# Patient Record
Sex: FEMALE | Race: BLACK OR AFRICAN AMERICAN | Employment: FULL TIME | URBAN - METROPOLITAN AREA
[De-identification: names, ages, dates, MRNs, and addresses within clinical notes are randomized per-mention and may not be internally consistent; named-entity substitution may affect disease eponyms.]

---

## 2019-01-23 PROBLEM — R93.0 ABNORMAL CT OF THE HEAD: Status: ACTIVE | Noted: 2019-01-23

## 2019-01-23 PROBLEM — R94.31 ABNORMAL EKG: Status: ACTIVE | Noted: 2019-01-23

## 2019-01-23 PROBLEM — R42 LIGHTHEADEDNESS: Status: ACTIVE | Noted: 2019-01-23

## 2019-01-23 PROBLEM — R79.89 ELEVATED TSH: Status: ACTIVE | Noted: 2019-01-23

## 2020-06-09 ENCOUNTER — OFFICE VISIT (OUTPATIENT)
Dept: ORTHOPEDIC SURGERY | Facility: CLINIC | Age: 60
End: 2020-06-09

## 2020-06-09 VITALS
HEIGHT: 64 IN | DIASTOLIC BLOOD PRESSURE: 83 MMHG | HEART RATE: 62 BPM | BODY MASS INDEX: 34.35 KG/M2 | TEMPERATURE: 97.3 F | SYSTOLIC BLOOD PRESSURE: 126 MMHG | WEIGHT: 201.2 LBS

## 2020-06-09 DIAGNOSIS — G89.29 CHRONIC RIGHT SHOULDER PAIN: ICD-10-CM

## 2020-06-09 DIAGNOSIS — M54.2 NECK PAIN: Primary | ICD-10-CM

## 2020-06-09 DIAGNOSIS — M54.12 CERVICAL RADICULOPATHY: ICD-10-CM

## 2020-06-09 DIAGNOSIS — M25.511 CHRONIC RIGHT SHOULDER PAIN: ICD-10-CM

## 2020-06-09 DIAGNOSIS — G56.01 CARPAL TUNNEL SYNDROME ON RIGHT: ICD-10-CM

## 2020-06-09 RX ORDER — ATORVASTATIN CALCIUM 40 MG/1
TABLET, FILM COATED ORAL DAILY
COMMUNITY

## 2020-06-09 RX ORDER — LISINOPRIL 5 MG/1
5 TABLET ORAL DAILY
COMMUNITY
End: 2020-08-05

## 2020-06-09 RX ORDER — ASPIRIN 81 MG/1
81 TABLET ORAL DAILY
COMMUNITY

## 2020-06-09 RX ORDER — METFORMIN HYDROCHLORIDE 500 MG/1
TABLET ORAL 2 TIMES DAILY WITH MEALS
COMMUNITY
End: 2020-08-05

## 2020-06-09 RX ORDER — LEVOTHYROXINE SODIUM 75 UG/1
TABLET ORAL
COMMUNITY

## 2020-06-09 RX ORDER — VENLAFAXINE HYDROCHLORIDE 37.5 MG/1
37.5 TABLET, EXTENDED RELEASE ORAL DAILY
COMMUNITY
End: 2020-08-11

## 2020-06-09 RX ORDER — LIRAGLUTIDE 6 MG/ML
0.6 INJECTION SUBCUTANEOUS
COMMUNITY
End: 2020-08-11

## 2020-06-09 RX ORDER — HYDROCHLOROTHIAZIDE 12.5 MG/1
12.5 TABLET ORAL DAILY
COMMUNITY

## 2020-06-09 RX ORDER — TRAMADOL HYDROCHLORIDE 50 MG/1
50 TABLET ORAL
Qty: 50 TAB | Refills: 0 | Status: SHIPPED | OUTPATIENT
Start: 2020-06-09 | End: 2020-06-16

## 2020-06-09 RX ORDER — NAPROXEN SODIUM 220 MG
220 TABLET ORAL
COMMUNITY
End: 2022-03-07 | Stop reason: ALTCHOICE

## 2020-06-09 RX ORDER — METHOCARBAMOL 750 MG/1
TABLET, FILM COATED ORAL 4 TIMES DAILY
COMMUNITY
End: 2020-08-11
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
## 2020-06-10 ENCOUNTER — TELEPHONE (OUTPATIENT)
Dept: ORTHOPEDIC SURGERY | Facility: CLINIC | Age: 60
End: 2020-06-10

## 2020-06-10 NOTE — TELEPHONE ENCOUNTER
Patient called stating that her tramadol prescription was denied by her insurance and she was not sure why.  She asked if this can be reviewed and corrected please advise patient at [phone]

## 2020-06-10 NOTE — TELEPHONE ENCOUNTER
Spoke with patient she states insurance denied rx and was told she would need to pay out of pocket. Pt states rx is $18 and she will just pay for rx instead. No further questions or concerns.

## 2020-06-24 ENCOUNTER — HOSPITAL ENCOUNTER (OUTPATIENT)
Dept: MRI IMAGING | Age: 60
Discharge: HOME | End: 2020-06-24
Attending: PHYSICIAN ASSISTANT
Payer: MEDICAID

## 2020-06-24 ENCOUNTER — OFFICE VISIT (OUTPATIENT)
Dept: ORTHOPEDIC SURGERY | Age: 60
End: 2020-06-24

## 2020-06-24 VITALS
TEMPERATURE: 98.6 F | BODY MASS INDEX: 34.83 KG/M2 | OXYGEN SATURATION: 99 % | HEIGHT: 64 IN | SYSTOLIC BLOOD PRESSURE: 136 MMHG | HEART RATE: 60 BPM | WEIGHT: 204 LBS | RESPIRATION RATE: 20 BRPM | DIASTOLIC BLOOD PRESSURE: 85 MMHG

## 2020-06-24 DIAGNOSIS — R94.131 ABNORMAL EMG: ICD-10-CM

## 2020-06-24 DIAGNOSIS — M54.12 CERVICAL RADICULOPATHY: ICD-10-CM

## 2020-06-24 DIAGNOSIS — R20.2 NUMBNESS AND TINGLING OF RIGHT UPPER EXTREMITY: Primary | ICD-10-CM

## 2020-06-24 DIAGNOSIS — M54.2 NECK PAIN: ICD-10-CM

## 2020-06-24 DIAGNOSIS — R20.0 NUMBNESS AND TINGLING OF RIGHT UPPER EXTREMITY: Primary | ICD-10-CM

## 2020-06-24 DIAGNOSIS — E66.01 SEVERE OBESITY (HCC): ICD-10-CM

## 2020-06-24 PROCEDURE — 72141 MRI NECK SPINE W/O DYE: CPT

## 2020-06-24 NOTE — PROGRESS NOTES
Birgit Kim presents today for   Chief Complaint   Patient presents with    Arm Pain     right       Is someone accompanying this pt? no    Is the patient using any DME equipment during OV? no    Depression Screening:  3 most recent PHQ Screens 1/23/2019   Little interest or pleasure in doing things Several days   Feeling down, depressed, irritable, or hopeless More than half the days   Total Score PHQ 2 3   Trouble falling or staying asleep, or sleeping too much More than half the days   Feeling tired or having little energy More than half the days   Poor appetite, weight loss, or overeating More than half the days   Feeling bad about yourself - or that you are a failure or have let yourself or your family down Not at all   Trouble concentrating on things such as school, work, reading, or watching TV More than half the days   Moving or speaking so slowly that other people could have noticed; or the opposite being so fidgety that others notice Not at all   Thoughts of being better off dead, or hurting yourself in some way Not at all   PHQ 9 Score 11   How difficult have these problems made it for you to do your work, take care of your home and get along with others Not difficult at all       Coordination of Care:  1. Have you been to the ER, urgent care clinic since your last visit? no  Hospitalized since your last visit? no    2. Have you seen or consulted any other health care providers outside of the [address] [place] [address] since your last visit? no Include any pap smears or colon screening.  no
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
## 2020-07-07 ENCOUNTER — OFFICE VISIT (OUTPATIENT)
Dept: ORTHOPEDIC SURGERY | Facility: CLINIC | Age: 60
End: 2020-07-07

## 2020-07-07 VITALS
HEART RATE: 49 BPM | HEIGHT: 64 IN | WEIGHT: 203.2 LBS | BODY MASS INDEX: 34.69 KG/M2 | DIASTOLIC BLOOD PRESSURE: 80 MMHG | SYSTOLIC BLOOD PRESSURE: 126 MMHG | TEMPERATURE: 97.4 F

## 2020-07-07 DIAGNOSIS — M54.2 NECK PAIN: Primary | ICD-10-CM

## 2020-07-07 DIAGNOSIS — M54.12 CERVICAL RADICULOPATHY: ICD-10-CM

## 2020-07-07 NOTE — PROGRESS NOTES
Ms. Luna Chamberlain follows today for MRI of the cervical spine. She has persisted with right cervical discomfort as well as radicular pain in the right upper extremity. She has weakness in the right arm when compared to the left which is unchanged from previous visit. EMG nerve conduction study revealed distal neuropathy likely diabetes related. Results of MRI below:  Result Information     Status: Final result (Exam End: 6/24/2020 08:54) Provider Status: Reviewed   Study Result     EXAM: MRI OF THE CERVICAL SPINE, WITHOUT IV CONTRAST     CLINICAL INDICATION/HISTORY: 58-year-old patient with right arm, right hand  pain. Cervical radiculopathy with neck pain.    > Additional: None.     COMPARISON: None. > Reference Exam: None.     TECHNIQUE: T1 weighted sagittal and axial, STIR and T2 FSE sagittal, T2 FSE  axial.  _______________     FINDINGS:     OSSEOUS, CERVICAL ALIGNMENT, CRANIOCERVICAL JUNCTION: There is straightening of  usual cervical lordosis present without evidence of listhesis. Vertebral body  heights are maintained. Intervertebral disc height loss and degenerative spur  formation noted throughout several levels of the cervical spine, greatest  conspicuity at the C3-C4, C5-C6, and CT 6-C7 levels. No suspicious osseous  lesion. No bone marrow signal abnormalities identified to suggest fracture. Overall maintenance of normal bone marrow signal with mild endplate reactive  changes involving the C5-C7 segments. Mild degenerative change across  atlantodental articulation noted.       CERVICAL CORD, VISUALIZED POSTERIOR FOSSA: Visualized posterior fossa is  unremarkable. No Chiari I malformation. Cord morphology and signal intensity  are unremarkable throughout.     PARASPINOUS SOFT TISSUES, VISUALIZED SOFT TISSUE NECK: Visualized soft tissues  are unremarkable.     C2-C3: No significant disc pathology.  Mild right-sided facet and uncovertebral  joint proliferative changes are noted without evidence of neuroforaminal  stenosis. No spinal canal stenosis.     C3-C4: Mild disc bulge with central to left paracentral disc protrusion. AP  diameter of the thecal sac at the level of maximal narrowing estimated at  approximately 9 mm. There is asymmetric uncovertebral and facet joint  osteoarthritis with mild neuroforaminal narrowing. Left neural foramen appears  patent.     C4-C5: Minor disc bulge. No spinal canal stenosis. Asymmetric uncovertebral and  facet joint osteoarthritis, right greater than left with moderately severe  right-sided neuroforaminal narrowing. More mild appearing left-sided  neuroforaminal narrowing is present. No spinal canal stenosis.     C5-C6: Disc osteophyte complex with ventral impression of the thecal sac. AP  diameter of the thecal sac estimated at approximately 9 mm. Bilateral, left  greater than right uncovertebral and facet joint osteoarthritis with mild to  moderate left-sided neuroforaminal narrowing. Right neuroforamen patent.     C6-C7: Relatively diffuse disc osteophyte complex with ventral impression of the  thecal sac. Minimal cord deformation without evidence of abnormal internal cord  signal. AP diameter of the thecal sac at this level is estimated at  approximately 8 mm. Mild right-sided uncovertebral joint proliferative change  noted. Facet joints appear within normal limits. Minor right-sided  neuroforaminal narrowing.     C7-T1: No significant disc pathology. Facet joints appear within normal limits. No spinal canal or neuroforaminal stenosis.     For the levels below T1, no significant spinal canal or neuroforaminal narrowing  is present.     _______________     IMPRESSION  IMPRESSION:        1. Overall straightening of usual cervical lordosis without evidence of  listhesis. 2. Multilevel spondylosis, greatest conspicuity across the C5-C7 segments.     > Overall degree of spinal canal stenosis in these regions is mild.  No  abnormal internal cord signal.  3. Uncovertebral and facet joint osteoarthritis with several areas of  neuroforaminal narrowing as described in detail by level above. These findings  appear most pronounced on the right at C4-C5. Plan: Patient being [facility] to [facility] for recommendations regarding above findings consistent with severe neuroforaminal narrowing on the right at C4-5. Patient to follow-up with our office PRN. She has completed physical therapy which did not successfully relieve her right upper extremity symptoms. She may use over-the-counter Tylenol/Motrin per 's recommendations. Topical analgesics are also approved per 's recommendations. Today MRI reviewed all of her questions answered to her satisfaction copy provided.

## 2020-08-05 ENCOUNTER — OFFICE VISIT (OUTPATIENT)
Dept: ORTHOPEDIC SURGERY | Age: 60
End: 2020-08-05

## 2020-08-05 VITALS
DIASTOLIC BLOOD PRESSURE: 78 MMHG | WEIGHT: 199 LBS | TEMPERATURE: 98.2 F | HEIGHT: 64 IN | HEART RATE: 70 BPM | BODY MASS INDEX: 33.97 KG/M2 | RESPIRATION RATE: 16 BRPM | OXYGEN SATURATION: 98 % | SYSTOLIC BLOOD PRESSURE: 128 MMHG

## 2020-08-05 DIAGNOSIS — M54.12 RIGHT CERVICAL RADICULOPATHY: Primary | ICD-10-CM

## 2020-08-05 DIAGNOSIS — G62.9 PERIPHERAL POLYNEUROPATHY: Chronic | ICD-10-CM

## 2020-08-05 RX ORDER — GABAPENTIN 300 MG/1
CAPSULE ORAL
Qty: 60 CAP | Refills: 1 | Status: SHIPPED | OUTPATIENT
Start: 2020-08-05 | End: 2020-09-14

## 2020-08-05 RX ORDER — BUPIVACAINE HYDROCHLORIDE 2.5 MG/ML
0.5 INJECTION, SOLUTION INFILTRATION; PERINEURAL ONCE
Qty: 0.5 ML | Refills: 0
Start: 2020-08-05 | End: 2020-08-05

## 2020-08-05 RX ORDER — LIDOCAINE HYDROCHLORIDE 20 MG/ML
0.5 INJECTION, SOLUTION EPIDURAL; INFILTRATION; INTRACAUDAL; PERINEURAL ONCE
Qty: 0.5 ML | Refills: 0
Start: 2020-08-05 | End: 2020-08-05

## 2020-08-05 RX ORDER — BETAMETHASONE SODIUM PHOSPHATE AND BETAMETHASONE ACETATE 3; 3 MG/ML; MG/ML
12 INJECTION, SUSPENSION INTRA-ARTICULAR; INTRALESIONAL; INTRAMUSCULAR; SOFT TISSUE ONCE
Qty: 2 ML | Refills: 0
Start: 2020-08-05 | End: 2020-08-05

## 2020-08-05 NOTE — PROGRESS NOTES
Patient denies any symptoms, reactions, or allergies that would exclude them from receiving a Right Upper Trap x2 TPI injection today, given by Dr. Riri Harper 2mL Celestone, 0.5mL Marcaine, 0.5mL Lidocaine. Risks and adverse reactions were discussed, consent obtained, and the VIS was given to them. All questions were addressed. There were no reactions observed.     Elizbeth Harada

## 2020-08-05 NOTE — PATIENT INSTRUCTIONS
Learning About Trigger Point Injections What are trigger point injections? A trigger point is a painful knot in a tight band of muscle. A trigger point often causes pain to be felt in other areas, too. For example, a trigger point in the neck or upper back can cause pain in the head. Trigger point injections are shots of medicine into these knots to help relieve the pain. The medicines are usually local anesthetics like lidocaine. Trigger point injections are often part of plan that includes other treatments, such as muscle stretching and strengthening. How is a trigger point injection done? Your doctor first locates a trigger point by pressing around the painful area. This may cause your muscle to hurt or twitch. This tells the doctor that he or she has found the spot to do the injection. The area is cleaned. Your doctor then injects the medicine into the trigger point. He or she may inject the medicine in more than one direction within the trigger point. The doctor may change direction without removing the needle. If you have more than one trigger point in the muscle, your doctor may repeat the process. Your doctor may stretch the area to help the muscle relax. He or she may also show you how to move and stretch the muscle yourself. The procedure takes about 10 to 30 minutes. How long it takes depends on how many trigger points are treated. But an injection itself takes only a few moments. What can you expect after a trigger point injection? The area may feel a bit numb for a few hours. It may also feel sore. Other problems from trigger point injections are rare. There is a chance of skin infection at the injection site. And if injections are done in the chest area, there is a small risk of puncturing the outer lining of the lung (pneumothorax). Trigger point injections may reduce some or all of your pain. But the pain can come back after the medicine wears off.  If your pain comes back, your doctor may suggest more shots or other treatment for longer-lasting relief. Follow your doctor's instructions carefully. And tell your doctor about any new or unusual symptoms, such as chest pain or shortness of breath. Follow-up care is a key part of your treatment and safety. Be sure to make and go to all appointments, and call your doctor if you are having problems. It's also a good idea to know your test results and keep a list of the medicines you take. Where can you learn more? Go to http://benjie-charo.info/ Enter 19872 [id number] [phone] in the search box to learn more about \"Learning About Trigger Point Injections. \" Current as of: March 2, 2020               Content Version: 12.5 © 2028-2333 [employer]. Care instructions adapted under license by [employer] (which disclaims liability or warranty for this information). If you have questions about a medical condition or this instruction, always ask your healthcare professional. [address] any warranty or liability for your use of this information.

## 2020-08-05 NOTE — PROGRESS NOTES
[address]  [address], [address]  [address]  Phone: [phone]  Fax: [phone]        Char Holman  : 1960  PCP: Vero Wahl MD      NEW PATIENT      ASSESSMENT AND PLAN     Diagnoses and all orders for this visit:    1. Right cervical radiculopathy  -     bupivacaine (Marcaine) 0.25 % (2.5 mg/mL) soln injection; 0.5 mL by IntraMUSCular route once for 1 dose. -     INJECTION, BUPIVICAINE HYDRO  -     LIDOCAINE INJECTION  -     lidocaine, PF, (XYLOCAINE) 20 mg/mL (2 %) injection; 0.5 mL by Other route once for 1 dose. -     betamethasone (Celestone Soluspan) 6 mg/mL injection; 2 mL by IntraMUSCular route once for 1 dose.  -     BETAMETHASONE ACETATE & SODIUM PHOSPHATE INJECTION 3 MG EA.  -     GA INJECT TRIGGER POINT, 1 OR 2  -     gabapentin (NEURONTIN) 300 mg capsule; Take 1 tab po QHS for 1 week then increase to 1 tab po BID. 2. Peripheral polyneuropathy, EDX confirmed UEs, 2020      1. 62 y.o. female with neuropathy greater than radiculopathy. 2. Advised to stay active as tolerated. 3. Trial of Gabapentin 300 mg BID  4. Discussed life style modification, PT, medication, spinal injection, and surgery as treatment options  5. Instructed to not lift more than 20 lbs  6. Avoid overhead lifting or reaching. 7. R upper trap x2 TPI  8. Given information on TPI      Follow-up and Dispositions    · Return in 1 month (on 2020). CHIEF COMPLAINT  Angel Cerrato is seen today in consultation at the request of CASS Alejandro for complaints of neck and RUE pain       HISTORY OF PRESENT ILLNESS  Angel Cerrato is a 62 y.o. female. Today pt c/o neck and RUE pain of 6 mo duration. Pt denies any specific incident or injury that caused their pain. Pt states that she started losing strength in her hands, noting that she has been dropping objects.  Affirms that she feels a pain on the R side of her head that \"feels like somebody slapped her across her face. \" Admits to headaches. She denies sleeping well at night. Pt denies any recent GI ulcers, bleeds or renal dysfucntion. Pain Assessment  8/5/2020   Location of Pain Neck   Location Modifiers Posterior   Severity of Pain 5   Quality of Pain Throbbing;Burning; [place]; Aching; Other (Comment)   Quality of Pain Comment weakness and pinching   Duration of Pain Persistent   Frequency of Pain Constant   Aggravating Factors Other (Comment)   Aggravating Factors Comment daily activity   Limiting Behavior Some   Relieving Factors Rest;NSAID   Relieving Factors Comment -   Result of Injury No       Does pain radiate into extremities: RUE  Does patient have numbness/tingling: R hand. R foot. known neuropathy  Does patient have weakness: Admits to dropping objects   Pt denies saddle paresthesias. Admits to incomplete bladder emptying  Medications pt is on: Aspirin. Motrin. Denies persistent fevers, chills, weight changes, neurogenic bowel symptoms. Treatments patient has tried:  Physical therapy:Yes; 6/2020 increased her pain  Doing HEP: Unknown  Failed medications: Naproxen-nausea, Tramadol, Percocet, Robaxin-no benefit  Spinal injections: No    Spinal surgery- No.   Spine surgery consult: No.     BUE EMG 6/2020: positive for peripheral polyneuropathy  Last C MRI 6/2020: deg changes, mild stenosis C4-7. C5-6 and C6-7 DDD     reviewed. RHD. PMHx of DM, asthma, HTN, kidney stones, incomplete bladder emptying. Works as a personal CNA. Normal GFR.       PAST MEDICAL HISTORY   Past Medical History:   Diagnosis Date    Arthritis     Asthma     Cyst of left kidney     Diabetes ([facility] [address]     Diverticulosis     Gross hematuria     Heart murmur     Hyperlipidemia     Hypertension     Hypothyroid     Hypothyroidism     Incomplete bladder emptying     Incontinence     Kidney stone     Ovarian cyst, right     Renal cyst     Sinusitis     Type 2 diabetes mellitus ([facility] [address]     UTI (urinary tract infection)        Past Surgical History:   Procedure Laterality Date    HX HYSTERECTOMY      HX OVARIAN CYST REMOVAL Right     HX TONSILLECTOMY         MEDICATIONS      Current Outpatient Medications   Medication Sig Dispense Refill    gabapentin (NEURONTIN) 300 mg capsule Take 1 tab po QHS for 1 week then increase to 1 tab po BID. 60 Cap 1    levothyroxine (Synthroid) 75 mcg tablet Take  by mouth Daily (before breakfast).  venlafaxine-ER 24 HR (EFFEXOR-ER) 37.5 mg tr24 tablet Take 37.5 mg by mouth daily.  atorvastatin (LIPITOR) 40 mg tablet Take  by mouth daily.  aspirin delayed-release 81 mg tablet Take  by mouth daily.  naproxen sodium (Aleve) 220 mg tablet Take 220 mg by mouth two (2) times daily (with meals).  hydroCHLOROthiazide (HYDRODIURIL) 12.5 mg tablet Take 12.5 mg by mouth daily.  liraglutide (Victoza 2-Carlos Eduardo) 0.6 mg/0.1 mL (18 mg/3 mL) pnij 0.6 mg by SubCUTAneous route.  methocarbamoL (Robaxin-750) 750 mg tablet Take  by mouth four (4) times daily.  psyllium husk/aspartame (METAMUCIL FIBER SINGLES PO) Take  by mouth daily as needed.  VICTOZA 2-CARLOS EDUARDO 0.6 mg/0.1 mL (18 mg/3 mL) pnij INJECT 0.6MG SUBCUTANEOUSLY ONCE DAILY  2    ibuprofen (MOTRIN) 800 mg tablet TAKE 1 TABLET BY MOUTH THREE TIMES DAILY FOR 10 DAYS  0    venlafaxine-SR (EFFEXOR-XR) 37.5 mg capsule venlafaxine ER 37.5 mg capsule,extended release 24 hr   TAKE 1 CAPSULE BY MOUTH ONCE DAILY      metFORMIN (GLUCOPHAGE) 1,000 mg tablet Take 1 Tab by mouth two (2) times daily (with meals). 60 Tab 0    fluticasone (FLOVENT DISKUS) 100 mcg/actuation dsdv Take  by inhalation. Controlled Substance Monitoring:    No flowsheet data found. ALLERGIES    Allergies   Allergen Reactions    Naproxen Nausea and Vomiting    Percocet [Oxycodone-Acetaminophen] Nausea and Vomiting    Tramadol Nausea Only and Vertigo     Not allergic, just don't tolerate well.     Tramadol Nausea and Vomiting     Not allergic just don't tolerate it well          SOCIAL HISTORY    Social History     Socioeconomic History    Marital status:      Spouse name: Not on file    Number of children: Not on file    Years of education: Not on file    Highest education level: Not on file   Occupational History    Not on file   Social Needs    Financial resource strain: Not on file    Food insecurity     Worry: Not on file     Inability: Not on file    Transportation needs     Medical: Not on file     Non-medical: Not on file   Tobacco Use    Smoking status: Never Smoker    Smokeless tobacco: Never Used   Substance and Sexual Activity    Alcohol use: Never     Frequency: Never    Drug use: Never    Sexual activity: Never   Lifestyle    Physical activity     Days per week: Not on file     Minutes per session: Not on file    Stress: Not on file   Relationships    Social connections     Talks on phone: Not on file     Gets together: Not on file     Attends Episcopal service: Not on file     Active member of club or organization: Not on file     Attends meetings of clubs or organizations: Not on file     Relationship status: Not on file    Intimate partner violence     Fear of current or ex partner: Not on file     Emotionally abused: Not on file     Physically abused: Not on file     Forced sexual activity: Not on file   Other Topics Concern    Not on file   Social History Narrative    ** Merged History Encounter **          Socioeconomic History    Marital status:      Spouse name: Not on file    Number of children: Not on file    Years of education: Not on file    Highest education level: Not on file   Occupational History    Not on file   Social Needs    Financial resource strain: Not on file    Food insecurity     Worry: Not on file     Inability: Not on file    Transportation needs     Medical: Not on file     Non-medical: Not on file   Tobacco Use    Smoking status: Never Smoker    Smokeless tobacco: Never Used   Substance and Sexual Activity    Alcohol use: Never     Frequency: Never    Drug use: Never    Sexual activity: Never   Lifestyle    Physical activity     Days per week: Not on file     Minutes per session: Not on file    Stress: Not on file   Relationships    Social connections     Talks on phone: Not on file     Gets together: Not on file     Attends Hinduism service: Not on file     Active member of club or organization: Not on file     Attends meetings of clubs or organizations: Not on file     Relationship status: Not on file    Intimate partner violence     Fear of current or ex partner: Not on file     Emotionally abused: Not on file     Physically abused: Not on file     Forced sexual activity: Not on file   Other Topics Concern    Not on file   Social History Narrative    ** Merged History Encounter **           Problem Relation Age of Onset    Heart Disease Mother     Stroke Father     Other Other         Epilepsy         REVIEW OF SYSTEMS  Review of Systems   Constitutional: Negative for chills, fever and weight loss. Respiratory: Negative for shortness of breath. Cardiovascular: Negative for chest pain. Gastrointestinal: Negative for constipation. Negative for fecal incontinence   Genitourinary: Negative for dysuria. Negative for urinary incontinence   Musculoskeletal: Positive for neck pain. Per HPI   Skin: Negative for rash. Neurological: Positive for tingling and headaches. Negative for dizziness, tremors and focal weakness. Endo/Heme/Allergies: Does not bruise/bleed easily. Psychiatric/Behavioral: The patient has insomnia. PHYSICAL EXAMINATION  Visit Vitals  /78 (BP 1 Location: Left arm, BP Patient Position: Sitting)   Pulse 70   Temp 98.2 °F (36.8 °C) (Oral)   Resp 16   Ht 5' 4\" (1.626 m)   Wt 199 lb (90.3 kg)   SpO2 98%   BMI 34.16 kg/m²          Accompanied by self. Constitutional:  Well developed, well nourished, in no acute distress. Psychiatric: Affect and mood are appropriate. Integumentary: No rashes or abrasions noted on exposed areas. Cardiovascular/Peripheral Vascular: No peripheral edema is noted BLE. SPINE/MUSCULOSKELETAL EXAM    Cervical spine:  Neck is midline. Normal muscle tone. No focal atrophy is noted. Trigger point R upper trap. Negative Spurling's sign. Negative Tinel's sign. Negative Collier's sign. MOTOR:      Biceps  Triceps Deltoids Wrist Ext Wrist Flex Hand Intrin   Right +4/5 +4/5 +4/5 +4/5 +4/5 +4/5   Left +4/5 +4/5 +4/5 +4/5 +4/5 +4/5     DTRs are hypoactive biceps, triceps, brachioradialis. Ambulation without assistive device. FWB. [facility]  OFFICE PROCEDURE PROGRESS NOTE      PROCEDURE: In the office today after informed consent using aseptic technique, the patient was injected with a total of 2 cc of Celestone, 0.5 cc each of Lidocaine and Marcaine into her right upper trapezius trigger point x2. Chart reviewed for the following:   IDr. Mayela, have reviewed the History, Physical and updated the Allergic reactions for Kulwant Shoulders. Local measures (ice/heat) and medications have not alleviated the symptoms. TIME OUT performed immediately prior to start of procedure:   Dr. Mayela MCGHEE, have performed the following reviews on Kulwant Shoulders prior to the start of the procedure:      Patient denies any recent fevers, chills, antibiotics, recent cortisone injections, or infections.         * Patient was identified by name and date of birth   * Agreement on procedure being performed was verified  * Risks and Benefits explained to the patient  * Procedure site verified and marked as necessary  * Patient was positioned for comfort  * Consent was signed and verified     Time: 3:17 PM    Date of procedure: 8/7/2020    Procedure performed by:  Florence Nettles Danny Muniz MD    Provider assisted by: None    Patient assisted by: Self    How tolerated by patient: Pt tolerated the procedure well with no complications. Written by Kuldip Gómez, as dictated by Edilberto Fowler MD.    I, Dr. Edilberto Fowler MD, confirm that all documentation is accurate. Ms. Bhavik Rodas may have a reminder for a \"due or due soon\" health maintenance. I have asked that she contact her primary care provider for follow-up on this health maintenance.

## 2020-09-14 ENCOUNTER — VIRTUAL VISIT (OUTPATIENT)
Dept: ORTHOPEDIC SURGERY | Age: 60
End: 2020-09-14

## 2020-09-14 DIAGNOSIS — M47.812 CERVICAL SPONDYLOSIS: Primary | ICD-10-CM

## 2020-09-14 DIAGNOSIS — G62.9 PERIPHERAL POLYNEUROPATHY: ICD-10-CM

## 2020-09-14 RX ORDER — PREGABALIN 75 MG/1
CAPSULE ORAL
Qty: 60 CAP | Refills: 1 | Status: SHIPPED | OUTPATIENT
Start: 2020-09-14 | End: 2020-12-17

## 2020-09-14 NOTE — PROGRESS NOTES
Simona Pace is a 56 y.o. female who was seen by synchronous (real-time) audio-video technology on 9/14/2020 through a Fingerprint platform. We converted this appointment to a telephone visit due to technical difficulties with the Doxy platform. I was in the office during this call. Diagnoses and all orders for this visit:    1. Cervical spondylosis  -     pregabalin (LYRICA) 75 mg capsule; One po qhs x 5 nights, then increase to one po BID thereafter  -     REFERRAL TO PAIN MANAGEMENT    2. Peripheral polyneuropathy, EDX confirmed UEs, 6/2020  -     pregabalin (LYRICA) 75 mg capsule; One po qhs x 5 nights, then increase to one po BID thereafter      1. 56 y.o. female w/ongoing severe neck pain. 2. DC Gabapentin  3. Referred to PM for C NNEKA  4. Trial of Lyrica 75 mg BID      Follow-up and Dispositions    · Return in about 1 month (around 10/14/2020). Details of this discussion including any medical advice provided:    Simona Pace is a 56 y.o. female. Pt last seen 8/2020 for R cervical radiculopathy. R upper trap x2 TPI. Trial of Gabapentin 300 mg BID.      Pt reports that the TPI she received last visit worked for about a week, but now her pain is back. Affirms RUE pain that extends down to her fingers. She states that her pain has gotten so severe that she doesn't know what to do. Admits that the Gabapentin does nothing for her neck and shoulder; however, she notes it does help her neuropathy. Denies hx of stroke or mini-stroke.  Denies previously trying Lyrica.     Pain Assessment  9/14/2020   Location of Pain Neck   Location Modifiers -   Severity of Pain 6   Quality of Pain Dull   Quality of Pain Comment numbness/ tingling in hands   Duration of Pain -   Frequency of Pain Constant   Aggravating Factors -   Aggravating Factors Comment -   Limiting Behavior -   Relieving Factors -   Relieving Factors Comment -   Result of Injury -     Does pain radiate into extremities: RAHEL MYERS shoulder  Does patient have numbness/tingling: R hand. R foot. known neuropathy  Does patient have weakness: Admits to dropping objects   Pt denies saddle paresthesias. Admits to incomplete bladder emptying  Medications pt is on: Gabapentin 300 mg. Aleve. Motrin. Denies persistent fevers, chills, weight changes, neurogenic bowel symptoms.      Treatments patient has tried:  Physical therapy:Yes; 6/2020 increased her pain  Doing HEP: Unknown  Failed medications: Naproxen-nausea, Tramadol, Percocet, Robaxin-no benefit  Spinal injections: No     Spinal surgery- No.   Spine surgery consult: No.      [facility] EMG 6/2020: positive for peripheral polyneuropathy  Last C MRI 6/2020: deg changes, mild stenosis C4-7. C5-6 and C6-7 DDD      reviewed. RHD. PMHx of DM, asthma, HTN, kidney stones, incomplete bladder emptying. Works as a personal CNA. Normal GFR. Admission 8/2020 colonoscopy - hemorrhoids noted. Documentation:  Current Outpatient Medications on File Prior to Visit   Medication Sig Dispense Refill    dicyclomine (BENTYL) 20 mg tablet Take 1 Tab by mouth every six (6) hours as needed for Abdominal Cramps. 120 Tab 3    levothyroxine (Synthroid) 75 mcg tablet Take  by mouth Daily (before breakfast).  atorvastatin (LIPITOR) 40 mg tablet Take  by mouth daily.  aspirin delayed-release 81 mg tablet Take  by mouth daily.  naproxen sodium (Aleve) 220 mg tablet Take 220 mg by mouth two (2) times daily as needed.  hydroCHLOROthiazide (HYDRODIURIL) 12.5 mg tablet Take 12.5 mg by mouth daily.  psyllium husk/aspartame (METAMUCIL FIBER SINGLES PO) Take  by mouth daily as needed.  metFORMIN (GLUCOPHAGE) 1,000 mg tablet Take 1 Tab by mouth two (2) times daily (with meals). 60 Tab 0    fluticasone (FLOVENT DISKUS) 100 mcg/actuation dsdv Take  by inhalation as needed.  gabapentin (NEURONTIN) 300 mg capsule Take 300 mg by mouth two (2) times a day.        No current facility-administered medications on file prior to visit. Allergies   Allergen Reactions    Naproxen Nausea and Vomiting    Percocet [Oxycodone-Acetaminophen] Nausea and Vomiting    Tramadol Nausea Only and Vertigo     Not allergic, just don't tolerate well.  Tramadol Nausea and Vomiting     Not allergic just don't tolerate it well      Past Medical History:   Diagnosis Date    Arthritis     Asthma     Cyst of left kidney     Diabetes ([facility] [address]     Diverticulosis     Gross hematuria     Heart murmur     Hyperlipidemia     Hypertension     Hypothyroid     Hypothyroidism     Incomplete bladder emptying     Incontinence     Kidney stone     Ovarian cyst, right     Renal cyst     Sinusitis     Type 2 diabetes mellitus (HCC)     UTI (urinary tract infection)       Past Surgical History:   Procedure Laterality Date    COLONOSCOPY N/A 8/12/2020    DIAGNOSTIC COLONOSCOPY performed by Bolivar Padilla MD at [facility] ENDOSCOPY    HX HYSTERECTOMY      HX OVARIAN CYST REMOVAL Right     HX TONSILLECTOMY              Consent:  She and/or health care decision maker is aware that that she may receive a bill for this telephone service, depending on her insurance coverage, and has provided verbal consent to proceed: Yes    I affirm this is a Patient Initiated Episode with an Established Patient who has not had a related appointment within my department in the past 7 days or scheduled within the next 24 hours.     Total Time: minutes: 11-20 minutes Phone visit start time 4:13, end time 4:26 PM.    Note: not billable if this call serves to triage the patient into an appointment for the relevant concern    Pursuant to the emergency declaration under the [address] [facility] [place], [address] waiver authority and the [employer] and [employer] General Act, this Virtual  Visit was conducted, with patient's consent, to reduce the patient's risk of exposure to COVID-19 and provide continuity of care for an established patient. Dragon voice recognition software was used in the creation of this note. Unintended transcription, spelling, and grammar errors may be present. This document has been electronically signed but not proofread for these specific errors. Written by Darrell Chery. Bacilio Vazquez, as dictated by Kelsey Kent MD.    I, Dr. Kelsey Kent MD, confirm that all documentation is accurate.

## 2020-09-14 NOTE — PROGRESS NOTES
Patrizia Johnson is a 56 y.o. female who was seen by synchronous (real-time) audio-video technology on 9/14/2020 through a [employer] platform. We converted this appointment to a telephone visit due to technical difficulties with the Doxy platform. Assessment & Plan:  
{There are no diagnoses linked to this encounter. (Refresh or delete this SmartLink)} 1. 56 y.o. female ***. 2. DC Gabapentin 3. Trial of Lyrica Follow-up and Dispositions · Return if symptoms worsen or fail to improve. Subjective:  
 
 
Patrizia Johnson is seen today for Neck Pain Patrizia Johnson is a 56 y.o. female. Pt last seen 8/2020 for R cervical radiculopathy. R upper trap x2 TPI. Trial of Gabapentin 300 mg BID. Pt reports that the TPI worked for about a week, but her pain is back. Affirms RUE pain to her fingers. She states that her pain gets so severe, and she doesn't know what to do. Admits that the Gabapentin does nothing for her neck and shoulder; however, she notes does help her neuropathy. Denies hx of stroke or mini-stroke. Denies previously trying Lyrica. Pain Assessment  9/14/2020 Location of Pain Neck Location Modifiers - Severity of Pain 6 Quality of Pain Dull Quality of Pain Comment numbness/ tingling in hands Duration of Pain - Frequency of Pain Constant Aggravating Factors - Aggravating Factors Comment - Limiting Behavior -  
Relieving Factors - Relieving Factors Comment - Result of Injury -  
 
Does pain radiate into extremities: RUE, R shoulder Does patient have numbness/tingling: R hand. R foot. known neuropathy Does patient have weakness: Admits to dropping objects Pt denies saddle paresthesias. Admits to incomplete bladder emptying Medications pt is on: Gabapentin 300 mg. Aleve. Motrin. Denies persistent fevers, chills, weight changes, neurogenic bowel symptoms.  
  
Treatments patient has tried: Physical therapy:Yes; 6/2020 increased her pain Doing HEP: Unknown Failed medications: Naproxen-nausea, Tramadol, Percocet, Robaxin-no benefit Spinal injections: No 
  
Spinal surgery- No.  
Spine surgery consult: No.  
  
[facility] EMG 6/2020: positive for peripheral polyneuropathy Last C MRI 6/2020: deg changes, mild stenosis C4-7. C5-6 and C6-7 DDD  reviewed. RHD. PMHx of DM, asthma, HTN, kidney stones, incomplete bladder emptying. Works as a personal CNA. Normal GFR. Admission 8/2020 colonoscopy - hemorrhoids noted. Controlled Substance Monitoring: No flowsheet data found. Prior to Admission medications Medication Sig Start Date End Date Taking? Authorizing Provider  
dicyclomine (BENTYL) 20 mg tablet Take 1 Tab by mouth every six (6) hours as needed for Abdominal Cramps. 8/12/20  Yes Robbie Bateman MD  
gabapentin (NEURONTIN) 300 mg capsule Take 1 tab po QHS for 1 week then increase to 1 tab po BID. Patient taking differently: 300 mg two (2) times a day. Take  1 tab po BID. 8/5/20  Yes Chanda Brooke MD  
levothyroxine (Synthroid) 75 mcg tablet Take  by mouth Daily (before breakfast). Yes Provider, Historical  
atorvastatin (LIPITOR) 40 mg tablet Take  by mouth daily. Yes Provider, Historical  
aspirin delayed-release 81 mg tablet Take  by mouth daily. Yes Provider, Historical  
naproxen sodium (Aleve) 220 mg tablet Take 220 mg by mouth two (2) times daily as needed. Yes Provider, Historical  
hydroCHLOROthiazide (HYDRODIURIL) 12.5 mg tablet Take 12.5 mg by mouth daily. Yes Provider, Historical  
psyllium husk/aspartame (METAMUCIL FIBER SINGLES PO) Take  by mouth daily as needed. Yes Provider, Historical  
metFORMIN (GLUCOPHAGE) 1,000 mg tablet Take 1 Tab by mouth two (2) times daily (with meals). 1/24/19  Yes Kiara Pennington MD  
fluticasone (FLOVENT DISKUS) 100 mcg/actuation dsdv Take  by inhalation as needed. Yes Provider, Historical  
 
Allergies Allergen Reactions  Naproxen Nausea and Vomiting  Percocet [Oxycodone-Acetaminophen] Nausea and Vomiting  Tramadol Nausea Only and Vertigo Not allergic, just don't tolerate well.  Tramadol Nausea and Vomiting Not allergic just don't tolerate it well Past Medical History:  
Diagnosis Date  Arthritis  Asthma  Cyst of left kidney  Diabetes ([facility] [address]  Diverticulosis  Gross hematuria  Heart murmur  Hyperlipidemia  Hypertension  Hypothyroid  Hypothyroidism  Incomplete bladder emptying  Incontinence  Kidney stone  Ovarian cyst, right  Renal cyst   
 Sinusitis  Type 2 diabetes mellitus ([facility] [address]  UTI (urinary tract infection) Past Surgical History:  
Procedure Laterality Date  COLONOSCOPY N/A 8/12/2020 DIAGNOSTIC COLONOSCOPY performed by Kiya Zabala MD at [address] [facility] Jero Hayen  HX OVARIAN CYST REMOVAL Right  HX TONSILLECTOMY    
  
 
ROS 
 
GENERAL :  Well developed, no acute distress HENT  :  Atraumatic, normocephalic SKIN:   No rash on visible areas. No abrasions. RESPIRATORY:  Non-labored breathing. No accessory respiratory muscle use. NEURO:  No tremor noted. Facial muscles symmetric. PSYCHIATRIC:  Normal affect. Conversant with normal thought process MUSCULOSKELETAL: *** Due to this being a TeleHealth evaluation, many elements of the physical examination are unable to be assessed. We discussed the expected course, resolution and complications of the diagnosis(es) in detail. Medication risks, benefits, interactions, and alternatives were discussed as indicated. I advised her to contact the office if her condition worsens, changes or fails to improve as anticipated. She expressed understanding with the diagnosis(es) and plan.   
 
Pursuant to the emergency declaration under the [address] [facility] waiver authority and the Coronavirus Preparedness and Response Supplemental Appropriations Act, this Virtual  Visit was conducted, with patient's consent, to reduce the patient's risk of exposure to COVID-19 and provide continuity of care for an established patient. Services were provided through a video synchronous discussion virtually to substitute for in-person clinic visit. Dragon voice recognition software was used in the creation of this note. Unintended transcription, spelling, and grammar errors may be present. This document has been electronically signed but not proofread for these specific errors. Consent: 
She and/or her healthcare decision maker is aware that this patient-initiated Telehealth encounter is a billable service, with coverage as determined by her insurance carrier. She is aware that she may receive a bill and has provided verbal consent to proceed: Yes 4:13 Written by Harrison Gonzalez, as dictated by Shara Watkins MD. 
 
I, Dr. Shara Watkins MD, confirm that all documentation is accurate.

## 2020-12-17 ENCOUNTER — OFFICE VISIT (OUTPATIENT)
Dept: ORTHOPEDIC SURGERY | Age: 60
End: 2020-12-17
Payer: MEDICAID

## 2020-12-17 VITALS
WEIGHT: 200 LBS | OXYGEN SATURATION: 100 % | DIASTOLIC BLOOD PRESSURE: 79 MMHG | SYSTOLIC BLOOD PRESSURE: 139 MMHG | HEIGHT: 64 IN | BODY MASS INDEX: 34.15 KG/M2 | HEART RATE: 60 BPM | TEMPERATURE: 98.2 F

## 2020-12-17 DIAGNOSIS — M47.812 CERVICAL SPONDYLOSIS: ICD-10-CM

## 2020-12-17 DIAGNOSIS — M54.12 RIGHT CERVICAL RADICULOPATHY: Primary | ICD-10-CM

## 2020-12-17 DIAGNOSIS — M54.50 LUMBAR PAIN: ICD-10-CM

## 2020-12-17 DIAGNOSIS — M54.2 NECK PAIN: ICD-10-CM

## 2020-12-17 DIAGNOSIS — G62.9 PERIPHERAL POLYNEUROPATHY: ICD-10-CM

## 2020-12-17 PROCEDURE — 99213 OFFICE O/P EST LOW 20 MIN: CPT | Performed by: NURSE PRACTITIONER

## 2020-12-17 RX ORDER — PREGABALIN 150 MG/1
150 CAPSULE ORAL 2 TIMES DAILY
Qty: 180 CAP | Refills: 0 | Status: SHIPPED | OUTPATIENT
Start: 2020-12-17 | End: 2021-02-11 | Stop reason: DRUGHIGH

## 2020-12-17 NOTE — PATIENT INSTRUCTIONS
Back Stretches: Exercises Introduction Here are some examples of exercises for stretching your back. Start each exercise slowly. Ease off the exercise if you start to have pain. Your doctor or physical therapist will tell you when you can start these exercises and which ones will work best for you. How to do the exercises Overhead stretch 1. Stand comfortably with your feet shoulder-width apart. 2. Looking straight ahead, raise both arms over your head and reach toward the ceiling. Do not allow your head to tilt back. 3. Hold for 15 to 30 seconds, then lower your arms to your sides. 4. Repeat 2 to 4 times. Side stretch 1. Stand comfortably with your feet shoulder-width apart. 2. Raise one arm over your head, and then lean to the other side. 3. Slide your hand down your leg as you let the weight of your arm gently stretch your side muscles. Hold for 15 to 30 seconds. 4. Repeat 2 to 4 times on each side. Press-up 1. Lie on your stomach, supporting your body with your forearms. 2. Press your elbows down into the floor to raise your upper back. As you do this, relax your stomach muscles and allow your back to arch without using your back muscles. As your press up, do not let your hips or pelvis come off the floor. 3. Hold for 15 to 30 seconds, then relax. 4. Repeat 2 to 4 times. Relax and rest  
1. Lie on your back with a rolled towel under your neck and a pillow under your knees. Extend your arms comfortably to your sides. 2. Relax and breathe normally. 3. Remain in this position for about 10 minutes. 4. If you can, do this 2 or 3 times each day. Follow-up care is a key part of your treatment and safety. Be sure to make and go to all appointments, and call your doctor if you are having problems. It's also a good idea to know your test results and keep a list of the medicines you take. Where can you learn more? Go to http://www.Div[employer].com/ Enter S324 in the search box to learn more about \"Back Stretches: Exercises. \" Current as of: March 2, 2020               Content Version: 12.6 © 2006-2020 [employer]. Care instructions adapted under license by [employer] (which disclaims liability or warranty for this information). If you have questions about a medical condition or this instruction, always ask your healthcare professional. [address] any warranty or liability for your use of this information. Neck Arthritis: Exercises Introduction Here are some examples of exercises for you to try. The exercises may be suggested for a condition or for rehabilitation. Start each exercise slowly. Ease off the exercises if you start to have pain. You will be told when to start these exercises and which ones will work best for you. How to do the exercises Neck stretches to the side 5. This stretch works best if you keep your shoulder down as you lean away from it. To help you remember to do this, start by relaxing your shoulders and lightly holding on to your thighs or your chair. 6. Tilt your head toward your shoulder and hold for 15 to 30 seconds. Let the weight of your head stretch your muscles. 7. Repeat 2 to 4 times toward each shoulder. Chin tuck 5. Lie on the floor with a rolled-up towel under your neck. Your head should be touching the floor. 6. Slowly bring your chin toward your chest. 
7. Hold for a count of 6, and then relax for up to 10 seconds. 8. Repeat 8 to 12 times. Active cervical rotation 5. Sit in a firm chair, or stand up straight. 6. Keeping your chin level, turn your head to the right, and hold for 15 to 30 seconds. 7. Turn your head to the left and hold for 15 to 30 seconds. 8. Repeat 2 to 4 times to each side. Shoulder blade squeeze 5. While standing, squeeze your shoulder blades together. 6. Do not raise your shoulders up as you are squeezing. 7. Hold for 6 seconds. 8. Repeat 8 to 12 times. Shoulder rolls 1. Sit comfortably with your feet shoulder-width apart. You can also do this exercise standing up. 2. Roll your shoulders up, then back, and then down in a smooth, circular motion. 3. Repeat 2 to 4 times. Follow-up care is a key part of your treatment and safety. Be sure to make and go to all appointments, and call your doctor if you are having problems. It's also a good idea to know your test results and keep a list of the medicines you take. Where can you learn more? Go to http://www.gray.com/ Enter V819 in the search box to learn more about \"Neck Arthritis: Exercises. \" Current as of: March 2, 2020               Content Version: 12.6 © 6432-5629 [employer]. Care instructions adapted under license by [employer] (which disclaims liability or warranty for this information). If you have questions about a medical condition or this instruction, always ask your healthcare professional. [address] any warranty or liability for your use of this information.

## 2020-12-17 NOTE — PROGRESS NOTES
[address]  [address], [address]  [place], [address] [place] [address] Street  Phone: [phone]  Fax: [phone]    Matteo Ac  : 1960  PCP: Paloma Linda MD    PROGRESS NOTE    HISTORY OF PRESENT ILLNESS:  Chief Complaint   Patient presents with    Back Pain    Leg Pain     right     Cassius Pyle is a 56 y.o.  female with history of right cervical radiculopathy. She was last seen with Dr. Devin Portillo. She has failed gabapentin and was started on Lyrica. She was referred to PM for C NNEKA. Today, she states she ran out of her lyrica. It was some helpful. She she undergoing Cervical injections at this time. she has both neck and back pain and some right leg pain. Denies bladder/bowel dysfunction, saddle paresthesia, weakness, gait disturbance, or other neurological deficit. Pt at this time desires to  continue with current care/proceed with medication evaluation. .      Does pain radiate into extremities: RUE R shoulder  Does patient have numbness/tingling: R hand. R foot. known neuropathy  Does patient have weakness: Admits to dropping objects   Pt denies saddle paresthesias. Admits to incomplete bladder emptying  Medications pt is on: Gabapentin 300 mg. Aleve. Motrin. Denies persistent fevers, chills, weight changes, neurogenic bowel symptoms.      Treatments patient has tried:  Physical therapy:Yes; 2020 increased her pain  Doing HEP: Unknown  Failed medications: Naproxen-nausea, Tramadol, Percocet, Robaxin-no benefit  Spinal injections: No     Spinal surgery- No.   Spine surgery consult: No.      BUE EMG 2020: positive for peripheral polyneuropathy  Last C MRI 2020: deg changes, mild stenosis C4-7. C5-6 and C6-7 DDD     RHD. PMHx of DM, asthma, HTN, kidney stones, incomplete bladder emptying. Works as a personal CNA. Normal GFR.  Admission 2020 colonoscopy - hemorrhoids noted.        ASSESSMENT  56 y.o. female with back and neck pain.   Diagnoses and all orders for this visit:    1. Right cervical radiculopathy  -     pregabalin (Lyrica) 150 mg capsule; Take 1 Cap by mouth two (2) times a day. Max Daily Amount: 300 mg.    2. Cervical spondylosis  -     pregabalin (Lyrica) 150 mg capsule; Take 1 Cap by mouth two (2) times a day. Max Daily Amount: 300 mg.    3. Peripheral polyneuropathy, EDX confirmed UEs, 6/2020  -     pregabalin (Lyrica) 150 mg capsule; Take 1 Cap by mouth two (2) times a day. Max Daily Amount: 300 mg.    4. Neck pain  -     pregabalin (Lyrica) 150 mg capsule; Take 1 Cap by mouth two (2) times a day. Max Daily Amount: 300 mg.    5. Lumbar pain  -     pregabalin (Lyrica) 150 mg capsule; Take 1 Cap by mouth two (2) times a day. Max Daily Amount: 300 mg. IMPRESSION/PLAN    1) Pt was given information on back and neck exercises. 2) increase lyrica  3) cont with C NNEKA  4) Ms. Yamilka Negrete has a reminder for a \"due or due soon\" health maintenance. I have asked that she contact her primary care provider, Jennifer Augustine MD, for follow-up on this health maintenance. 5) We have informed patient to notify us for immediate appointment if he has any worsening neurogical symptoms or if an emergency situation presents, then call 911  6) Pt will follow-up in 3 months for med fu. Risks and benefits of ongoing therapy have been reviewed with the patient.  is appropriate. No pain behaviors. Denies thoughts of harming self or others. Pt has a good risk to benefit ratio which allows the pt to function in a home environment without side effects.          PAST MEDICAL HISTORY  Past Medical History:   Diagnosis Date    Arthritis     Asthma     Cyst of left kidney     [facility] [address]     Diverticulosis     Gross hematuria     Heart murmur     Hyperlipidemia     Hypertension     Hypothyroid     Hypothyroidism     Incomplete bladder emptying     Incontinence     Kidney stone     Ovarian cyst, right     Renal cyst     Sinusitis     [facility]     UTI (urinary tract infection)         MEDICATIONS  Current Outpatient Medications   Medication Sig Dispense Refill    pregabalin (Lyrica) 150 mg capsule Take 1 Cap by mouth two (2) times a day. Max Daily Amount: 300 mg. 180 Cap 0    cetirizine (ZyrTEC) 10 mg tablet Take  by mouth.  dicyclomine (BENTYL) 20 mg tablet Take 1 Tab by mouth every six (6) hours as needed for Abdominal Cramps. 120 Tab 3    levothyroxine (Synthroid) 75 mcg tablet Take  by mouth Daily (before breakfast).  atorvastatin (LIPITOR) 40 mg tablet Take  by mouth daily.  aspirin delayed-release 81 mg tablet Take  by mouth daily.  naproxen sodium (Aleve) 220 mg tablet Take 220 mg by mouth two (2) times daily as needed.  hydroCHLOROthiazide (HYDRODIURIL) 12.5 mg tablet Take 12.5 mg by mouth daily.  psyllium husk/aspartame (METAMUCIL FIBER SINGLES PO) Take  by mouth daily as needed.  metFORMIN (GLUCOPHAGE) 1,000 mg tablet Take 1 Tab by mouth two (2) times daily (with meals). 60 Tab 0    fluticasone (FLOVENT DISKUS) 100 mcg/actuation dsdv Take  by inhalation as needed. ALLERGIES  Allergies   Allergen Reactions    Naproxen Nausea and Vomiting    Percocet [Oxycodone-Acetaminophen] Nausea and Vomiting    Tramadol Nausea Only and Vertigo     Not allergic, just don't tolerate well.     Tramadol Nausea and Vomiting     Not allergic just don't tolerate it well       SOCIAL HISTORY    Social History     Socioeconomic History    Marital status:      Spouse name: Not on file    Number of children: Not on file    Years of education: Not on file    Highest education level: Not on file   Occupational History    Not on file   Social Needs    Financial resource strain: Not on file    Food insecurity     Worry: Not on file     Inability: Not on file    Transportation needs     Medical: Not on file     Non-medical: Not on file   Tobacco Use    Smoking status: Never Smoker    Smokeless tobacco: Never Used   Substance and Sexual Activity    Alcohol use: Never     Frequency: Never    Drug use: Never    Sexual activity: Never   Lifestyle    Physical activity     Days per week: Not on file     Minutes per session: Not on file    Stress: Not on file   Relationships    Social connections     Talks on phone: Not on file     Gets together: Not on file     Attends [employer]ism service: Not on file     Active member of club or organization: Not on file     Attends meetings of clubs or organizations: Not on file     Relationship status: Not on file    Intimate partner violence     Fear of current or ex partner: Not on file     Emotionally abused: Not on file     Physically abused: Not on file     Forced sexual activity: Not on file   Other Topics Concern    Not on file   Social History Narrative    ** Merged History Encounter **            SUBJECTIVE        Pain Scale: 8/10    Pain Assessment  12/17/2020   Location of Pain Back;Leg   Location Modifiers Right   Severity of Pain 8   Quality of Pain Aching; Other (Comment)   Quality of Pain Comment N/T in right leg   Duration of Pain -   Frequency of Pain Constant   Aggravating Factors -   Aggravating Factors Comment -   Limiting Behavior -   Relieving Factors -   Relieving Factors Comment -   Result of Injury -       Accompanied by self. REVIEW OF SYSTEMS  ROS    Constitutional: Negative for fever, chills, or weight change. Respiratory: Negative for cough or shortness of breath. Cardiovascular: Negative for chest pain or palpitations. Gastrointestinal: Negative for acid reflux, change in bowel habits, or constipation. Genitourinary: Negative for incontinence, dysuria and flank pain. Musculoskeletal: Positive for back and neck pain. Skin: Negative for rash. Neurological: Negative for headaches, dizziness, or numbness. Endo/Heme/Allergies: Negative . Psychiatric/Behavioral: Negative.        PHYSICAL EXAMINATION  Visit Vitals  /79 (BP 1 Location: Left arm, BP Patient Position: Sitting)   Pulse 60   Temp 98.2 °F (36.8 °C) (Oral)   Ht 5' 4\" (1.626 m)   Wt 200 lb (90.7 kg)   SpO2 100%   BMI 34.33 kg/m²       Constitutional: Well developed,  well nourished,  awake, alert, and in no acute distress. Neurological:  Sensation to light touch is intact. Psychiatric: Affect and mood are appropriate. Integumentary: No rashes or abrasions noted on exposed areas,  warm, dry and intact. Cardiovascular/Peripheral Vascular:  No peripheral edema is noted. Lymphatic:  No evidence of lymphedema. No cervical lymphadenopathy. SPINE/MUSCULOSKELETAL EXAM    Cervical spine:  Neck is midline. Normal muscle tone. No focal atrophy is noted. Shoulder ROM intact. Tenderness to palpation to neck. Negative Spurling's sign. Negative Tinel's sign. Negative Collier's sign. Lumbar spine:  No rash, ecchymosis, or gross obliquity. No fasciculations. No focal atrophy is noted. Range of motion is intacy. Tenderness to palpation to low back. SI joints non-tender. Trochanters non tender. Musculoskeletal:  No pain with extension, axial loading, or forward flexion. No pain with internal or external rotation of her hips. MOTOR    Biceps  Triceps Deltoids Wrist Ext Wrist Flex Hand Intrin   Right +4/5 +4/5 +4/5 +4/5 +4/5 +4/5   Left +4/5 +4/5 +4/5 +4/5 +4/5 +4/5      Hip Flex  Quads Hamstrings Ankle DF EHL Ankle PF   Right +4/5 +4/5 +4/5 +4/5 +4/5 +4/5   Left +4/5 +4/5 +4/5 +4/5 +4/5 +4/5     Straight Leg raise - bilaterally. normal gait and station    Ambulation without assistive device. full weight bearing, non-antalgic gait.     Jim Dyer NP

## 2021-02-11 ENCOUNTER — OFFICE VISIT (OUTPATIENT)
Dept: ORTHOPEDIC SURGERY | Age: 61
End: 2021-02-11
Payer: MEDICAID

## 2021-02-11 VITALS
DIASTOLIC BLOOD PRESSURE: 64 MMHG | BODY MASS INDEX: 35.7 KG/M2 | TEMPERATURE: 97 F | SYSTOLIC BLOOD PRESSURE: 146 MMHG | WEIGHT: 208 LBS | HEART RATE: 69 BPM

## 2021-02-11 DIAGNOSIS — M54.2 CERVICAL PAIN: ICD-10-CM

## 2021-02-11 DIAGNOSIS — M48.02 FORAMINAL STENOSIS OF CERVICAL REGION: ICD-10-CM

## 2021-02-11 DIAGNOSIS — M54.2 CERVICAL PAIN: Primary | ICD-10-CM

## 2021-02-11 PROCEDURE — 99213 OFFICE O/P EST LOW 20 MIN: CPT | Performed by: NURSE PRACTITIONER

## 2021-02-11 RX ORDER — PREGABALIN 200 MG/1
200 CAPSULE ORAL 2 TIMES DAILY
Qty: 180 CAP | Refills: 0 | Status: SHIPPED | OUTPATIENT
Start: 2021-02-11 | End: 2021-06-14 | Stop reason: SDUPTHER

## 2021-02-11 NOTE — PROGRESS NOTES
[address]  [address], [address]  [place]  Phone: [phone]  Fax: [phone]    Olaf Fail  : 1960  PCP: Basia Vargas MD    PROGRESS NOTE    HISTORY OF PRESENT ILLNESS:  Chief Complaint   Patient presents with    Neck Pain     3mo fu     Radha López is a 56 y.o.  female with history of history of right cervical radiculopathy. She has failed gabapentin and was started on Lyrica. She was referred to PM for C NNEKA. we last increased lyrica and she was to continue with C NNEKA. Today, she states she is having pain all of the time. She states she went to get the last neck injection and was told not to come back. She states she is having every day pain in her neck and her arms. Denies bladder/bowel dysfunction, saddle paresthesia, weakness, gait disturbance, or other neurological deficit. Pt at this time desires to  continue with current care/proceed with medication evaluation. .     Does pain radiate into extremities: RUE, R shoulder  Does patient have numbness/tingling: R hand. R foot. known neuropathy  Does patient have weakness: Admits to dropping objects   Pt denies saddle paresthesias. Admits to incomplete bladder emptying  Medications pt is on: Gabapentin 300 mg. Aleve. Motrin. Denies persistent fevers, chills, weight changes, neurogenic bowel symptoms.      Treatments patient has tried:  Physical therapy:Yes; 2020 increased her pain  Doing HEP: Unknown  Failed medications: Naproxen-nausea, Tramadol, Percocet, Robaxin-no benefit  Spinal injections: No     Spinal surgery- No.   Spine surgery consult: No.      BUE EMG 2020: positive for peripheral polyneuropathy  Last C MRI 2020: deg changes, mild stenosis C4-7. C5-6 and C6-7 DDD     RHD. PMHx of DM, asthma, HTN, kidney stones, incomplete bladder emptying. Works as a personal CNA. Normal GFR.  Admission 2020 colonoscopy - hemorrhoids noted.          ASSESSMENT  56 y.o. female with neck pain. Diagnoses and all orders for this visit:    1. Cervical pain  -     REFERRAL TO SPINE SURGERY  -     MRI CERV SPINE WO CONT; Future  -     pregabalin (LYRICA) 200 mg capsule; Take 1 Cap by mouth two (2) times a day. Max Daily Amount: 400 mg.    2. Foraminal stenosis of cervical region  -     REFERRAL TO SPINE SURGERY  -     pregabalin (LYRICA) 200 mg capsule; Take 1 Cap by mouth two (2) times a day. Max Daily Amount: 400 mg. IMPRESSION/PLAN    1) Pt was given information on neck exercises. 2) Update C MRI for SC. She is having every day, progressive, severe pain. It does not let up. It is affecting her life. She is on Lyrica, has tried C NNEKA. She is desperate for relief. 3) increase lyrica to 200 mg BID  4) Ms. Sydney Bose has a reminder for a \"due or due soon\" health maintenance. I have asked that she contact her primary care provider, Lili Armando MD, for follow-up on this health maintenance. 5) We have informed patient to notify us for immediate appointment if he has any worsening neurogical symptoms or if an emergency situation presents, then call 911  6) Pt will follow-up with Dr. Osman Gallardo for [place]. Risks and benefits of ongoing  therapy have been reviewed with the patient.  is appropriate. No pain behaviors. Denies thoughts of harming self or others. Pt has a good risk to benefit ratio which allows the pt to function in a home environment without side effects.          PAST MEDICAL HISTORY  Past Medical History:   Diagnosis Date    Arthritis     Asthma     Cyst of left kidney     [facility] ([facility] [address]     Diverticulosis     Gross hematuria     Heart murmur     Hyperlipidemia     Hypertension     Hypothyroid     Hypothyroidism     Incomplete bladder emptying     Incontinence     Kidney stone     Ovarian cyst, right     Renal cyst     Sinusitis     Type 2 diabetes mellitus (HCC)     UTI (urinary tract infection) MEDICATIONS  Current Outpatient Medications   Medication Sig Dispense Refill    pregabalin (LYRICA) 200 mg capsule Take 1 Cap by mouth two (2) times a day. Max Daily Amount: 400 mg. 180 Cap 0    cetirizine (ZyrTEC) 10 mg tablet Take  by mouth.  dicyclomine (BENTYL) 20 mg tablet Take 1 Tab by mouth every six (6) hours as needed for Abdominal Cramps. 120 Tab 3    levothyroxine (Synthroid) 75 mcg tablet Take  by mouth Daily (before breakfast).  atorvastatin (LIPITOR) 40 mg tablet Take  by mouth daily.  aspirin delayed-release 81 mg tablet Take  by mouth daily.  naproxen sodium (Aleve) 220 mg tablet Take 220 mg by mouth two (2) times daily as needed.  hydroCHLOROthiazide (HYDRODIURIL) 12.5 mg tablet Take 12.5 mg by mouth daily.  psyllium husk/aspartame (METAMUCIL FIBER SINGLES PO) Take  by mouth daily as needed.  metFORMIN (GLUCOPHAGE) 1,000 mg tablet Take 1 Tab by mouth two (2) times daily (with meals). 60 Tab 0    fluticasone (FLOVENT DISKUS) 100 mcg/actuation dsdv Take  by inhalation as needed. ALLERGIES  Allergies   Allergen Reactions    Naproxen Nausea and Vomiting    Percocet [Oxycodone-Acetaminophen] Nausea and Vomiting    Tramadol Nausea Only and Vertigo     Not allergic, just don't tolerate well.     Tramadol Nausea and Vomiting     Not allergic just don't tolerate it well       SOCIAL HISTORY    Social History     Socioeconomic History    Marital status:      Spouse name: Not on file    Number of children: Not on file    Years of education: Not on file    Highest education level: Not on file   Occupational History    Not on file   Social Needs    Financial resource strain: Not on file    Food insecurity     Worry: Not on file     Inability: Not on file    Transportation needs     Medical: Not on file     Non-medical: Not on file   Tobacco Use    Smoking status: Never Smoker    Smokeless tobacco: Never Used   Substance and Sexual Activity  Alcohol use: Never     Frequency: Never    Drug use: Never    Sexual activity: Never   Lifestyle    Physical activity     Days per week: Not on file     Minutes per session: Not on file    Stress: Not on file   Relationships    Social connections     Talks on phone: Not on file     Gets together: Not on file     Attends [employer] service: Not on file     Active member of club or organization: Not on file     Attends meetings of clubs or organizations: Not on file     Relationship status: Not on file    Intimate partner violence     Fear of current or ex partner: Not on file     Emotionally abused: Not on file     Physically abused: Not on file     Forced sexual activity: Not on file   Other Topics Concern    Not on file   Social History Narrative    ** Merged History Encounter **            SUBJECTIVE      Pain Scale: 10 - Worst pain ever/10    Pain Assessment  2/11/2021   Location of Pain Neck;Hand   Location Modifiers Right;Left   Severity of Pain 10   Quality of Pain Aching;Dull; Bettylou Gaster; Throbbing;Burning   Quality of Pain Comment numbness tingling   Duration of Pain Persistent   Frequency of Pain Constant   Aggravating Factors (No Data)   Aggravating Factors Comment laying, just there all the time   Limiting Behavior Some   Relieving Factors Heat   Relieving Factors Comment -   Result of Injury -       Accompanied by self. REVIEW OF SYSTEMS  ROS    Constitutional: Negative for fever, chills, or weight change. Respiratory: Negative for cough or shortness of breath. Cardiovascular: Negative for chest pain or palpitations. Gastrointestinal: Negative for acid reflux, change in bowel habits, or constipation. Genitourinary: Negative for incontinence, dysuria and flank pain. Musculoskeletal: Positive for neck pain. Skin: Negative for rash. Neurological: Negative for headaches, dizziness, or numbness. Endo/Heme/Allergies: Negative . Psychiatric/Behavioral: Negative.        PHYSICAL EXAMINATION  Visit Vitals  BP (!) 146/64 (BP 1 Location: Left upper arm, BP Patient Position: Sitting)   Pulse 69   Temp 97 °F (36.1 °C) (Temporal)   Wt 208 lb (94.3 kg)   BMI 35.70 kg/m²       Constitutional: Well developed,  well nourished,  awake, alert, and in no acute distress. Neurological:  Sensation to light touch is intact. Psychiatric: Affect and mood are appropriate. Integumentary: No rashes or abrasions noted on exposed areas,  warm, dry and intact. Cardiovascular/Peripheral Vascular:  No peripheral edema is noted. Lymphatic:  No evidence of lymphedema. No cervical lymphadenopathy. SPINE/MUSCULOSKELETAL EXAM    Cervical spine:  Neck is midline. Normal muscle tone. No focal atrophy is noted. Shoulder ROM intact. Tenderness to palpation to neck. Negative Spurling's sign. Negative Tinel's sign. Negative Collier's sign.      Lumbar spine:  No rash, ecchymosis, or gross obliquity. No fasciculations. No focal atrophy is noted. Range of motion is intacy. Tenderness to palpation to low back. SI joints non-tender. Trochanters non tender.       Musculoskeletal:  No pain with extension, axial loading, or forward flexion. No pain with internal or external rotation of her hips.      MOTOR     Biceps  Triceps Deltoids Wrist Ext Wrist Flex Hand Intrin   Right +4/5 +4/5 +4/5 +4/5 +4/5 4/5   Left +4/5 +4/5 +4/5 +4/5 +4/5 4/5        Hip Flex  Quads Hamstrings Ankle DF EHL Ankle PF   Right +4/5 +4/5 +4/5 +4/5 +4/5 +4/5   Left +4/5 +4/5 +4/5 +4/5 +4/5 +4/5      Straight Leg raise - bilaterally.      normal gait and station     Ambulation without assistive device. full weight bearing, non-antalgic gait.     Krystina Weston NP

## 2021-02-12 ENCOUNTER — TELEPHONE (OUTPATIENT)
Dept: ORTHOPEDIC SURGERY | Age: 61
End: 2021-02-12

## 2021-02-12 NOTE — TELEPHONE ENCOUNTER
MRI of the Cervical Spine has been submitted for pre-authorization. Once pre-authorization is received, order will be forwarded to [address] for scheduling.

## 2021-02-17 NOTE — TELEPHONE ENCOUNTER
MRI of the Cervical Spine has been faxed to [address], [phone], for scheduling. [employer] pre-authorizationn is [id number], effective 02/12/21-04/12/21.

## 2021-02-17 NOTE — TELEPHONE ENCOUNTER
Our request for a MRI of the Cervical Spine has been deemed as not medically necessary. A peer to peer review can be completed no later than Friday, 02/19/21, at 5:00CST by calling [address] [place] [address] at [phone]. Patient ID [id number]. Medicaid product.

## 2021-06-14 DIAGNOSIS — M54.2 CERVICAL PAIN: ICD-10-CM

## 2021-06-14 DIAGNOSIS — M48.02 FORAMINAL STENOSIS OF CERVICAL REGION: ICD-10-CM

## 2021-06-14 RX ORDER — PREGABALIN 200 MG/1
200 CAPSULE ORAL 2 TIMES DAILY
Qty: 180 CAPSULE | Refills: 0 | Status: SHIPPED | OUTPATIENT
Start: 2021-06-14 | End: 2021-10-25 | Stop reason: SDUPTHER

## 2021-06-14 NOTE — TELEPHONE ENCOUNTER
Last Visit: 2/11/21 with TELLY Bacon  Next Appointment: 3/17/21 pt cancelled appt  Previous Refill Encounter(s): 2/11/21 #180    Requested Prescriptions     Pending Prescriptions Disp Refills    pregabalin (LYRICA) 200 mg capsule 180 Capsule 0     Sig: Take 1 Capsule by mouth two (2) times a day. Max Daily Amount: 400 mg.

## 2021-10-25 ENCOUNTER — TELEPHONE (OUTPATIENT)
Dept: ORTHOPEDIC SURGERY | Age: 61
End: 2021-10-25

## 2021-10-25 DIAGNOSIS — M48.02 FORAMINAL STENOSIS OF CERVICAL REGION: ICD-10-CM

## 2021-10-25 DIAGNOSIS — M54.2 CERVICAL PAIN: ICD-10-CM

## 2021-10-25 RX ORDER — PREGABALIN 200 MG/1
200 CAPSULE ORAL 2 TIMES DAILY
Qty: 28 CAPSULE | Refills: 0 | Status: SHIPPED | OUTPATIENT
Start: 2021-10-25 | End: 2021-11-10 | Stop reason: ALTCHOICE

## 2021-10-25 NOTE — TELEPHONE ENCOUNTER
Patient called stating she needed a refill of Lyrica, she stated she has about three days left. She is scheduled for 11/10, last seen 2/2021. She wanted to know if she can get a refill until she is seen. She confirmed [facility] is [facility] [address]    Please advise patient at [phone].

## 2021-11-10 ENCOUNTER — OFFICE VISIT (OUTPATIENT)
Dept: ORTHOPEDIC SURGERY | Age: 61
End: 2021-11-10
Payer: MEDICAID

## 2021-11-10 VITALS
TEMPERATURE: 96.8 F | HEIGHT: 64 IN | WEIGHT: 214 LBS | OXYGEN SATURATION: 96 % | BODY MASS INDEX: 36.54 KG/M2 | HEART RATE: 52 BPM

## 2021-11-10 DIAGNOSIS — M54.12 CERVICAL RADICULOPATHY: ICD-10-CM

## 2021-11-10 DIAGNOSIS — M62.838 MUSCLE SPASM: ICD-10-CM

## 2021-11-10 DIAGNOSIS — M48.02 CERVICAL STENOSIS OF SPINAL CANAL: ICD-10-CM

## 2021-11-10 DIAGNOSIS — M54.2 CERVICAL PAIN: Primary | ICD-10-CM

## 2021-11-10 DIAGNOSIS — M47.812 CERVICAL SPONDYLOSIS: ICD-10-CM

## 2021-11-10 PROCEDURE — 99215 OFFICE O/P EST HI 40 MIN: CPT | Performed by: NURSE PRACTITIONER

## 2021-11-10 RX ORDER — DICLOFENAC SODIUM 10 MG/G
2 GEL TOPICAL
COMMUNITY
Start: 2021-08-06

## 2021-11-10 RX ORDER — PREGABALIN 225 MG/1
225 CAPSULE ORAL 2 TIMES DAILY
Qty: 60 CAPSULE | Refills: 2 | Status: SHIPPED
Start: 2021-11-10 | End: 2022-03-07 | Stop reason: DRUGHIGH

## 2021-11-10 RX ORDER — METHOCARBAMOL 500 MG/1
1000 TABLET, FILM COATED ORAL
Qty: 30 TABLET | Refills: 2 | Status: SHIPPED
Start: 2021-11-10 | End: 2022-03-07 | Stop reason: DRUGHIGH
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
## 2021-11-10 NOTE — PROGRESS NOTES
Vanessa Brown presents today for   Chief Complaint   Patient presents with    Shoulder Pain     right       Is someone accompanying this pt? no    Is the patient using any DME equipment during OV? no    Depression Screening:  3 most recent PHQ Screens 2/11/2021   PHQ Not Done -   Little interest or pleasure in doing things Not at all   Feeling down, depressed, irritable, or hopeless Not at all   Total Score PHQ 2 0   Trouble falling or staying asleep, or sleeping too much -   Feeling tired or having little energy -   Poor appetite, weight loss, or overeating -   Feeling bad about yourself - or that you are a failure or have let yourself or your family down -   Trouble concentrating on things such as school, work, reading, or watching TV -   Moving or speaking so slowly that other people could have noticed; or the opposite being so fidgety that others notice -   Thoughts of being better off dead, or hurting yourself in some way -   PHQ 9 Score -   How difficult have these problems made it for you to do your work, take care of your home and get along with others -       Coordination of Care:  1. Have you been to the ER, urgent care clinic since your last visit? no  Hospitalized since your last visit? no    2. Have you seen or consulted any other health care providers outside of the [address] [place] [address] since your last visit? Yes, physical therapy, pcp Include any pap smears or colon screening.  no

## 2022-02-14 ENCOUNTER — OFFICE VISIT (OUTPATIENT)
Dept: ORTHOPEDIC SURGERY | Age: 62
End: 2022-02-14
Payer: MEDICAID

## 2022-02-14 VITALS
HEART RATE: 58 BPM | WEIGHT: 224 LBS | HEIGHT: 64 IN | RESPIRATION RATE: 16 BRPM | OXYGEN SATURATION: 98 % | BODY MASS INDEX: 38.24 KG/M2 | SYSTOLIC BLOOD PRESSURE: 131 MMHG | TEMPERATURE: 97.1 F | DIASTOLIC BLOOD PRESSURE: 77 MMHG

## 2022-02-14 DIAGNOSIS — M62.838 MUSCLE SPASM: ICD-10-CM

## 2022-02-14 DIAGNOSIS — R20.2 BILATERAL LEG PARESTHESIA: Primary | ICD-10-CM

## 2022-02-14 DIAGNOSIS — M54.12 CERVICAL RADICULOPATHY: ICD-10-CM

## 2022-02-14 DIAGNOSIS — R20.2 BILATERAL LEG PARESTHESIA: ICD-10-CM

## 2022-02-14 DIAGNOSIS — M47.812 CERVICAL SPONDYLOSIS: ICD-10-CM

## 2022-02-14 DIAGNOSIS — M48.02 CERVICAL STENOSIS OF SPINAL CANAL: ICD-10-CM

## 2022-02-14 DIAGNOSIS — M54.2 CERVICAL PAIN: ICD-10-CM

## 2022-02-14 PROCEDURE — 99214 OFFICE O/P EST MOD 30 MIN: CPT | Performed by: NURSE PRACTITIONER

## 2022-02-14 RX ORDER — METHOCARBAMOL 750 MG/1
750 TABLET, FILM COATED ORAL
Qty: 30 TABLET | Refills: 2 | Status: SHIPPED | OUTPATIENT
Start: 2022-02-14 | End: 2022-05-11 | Stop reason: SDUPTHER

## 2022-02-14 RX ORDER — PREGABALIN 300 MG/1
300 CAPSULE ORAL 2 TIMES DAILY
Qty: 60 CAPSULE | Refills: 1 | Status: SHIPPED | OUTPATIENT
Start: 2022-02-14 | End: 2022-05-11 | Stop reason: SDUPTHER

## 2022-02-14 NOTE — PROGRESS NOTES
Chief Complaint   Patient presents with    Follow-up       Pt preferred language for health care discussion is english. Is someone accompanying this pt? no    Is the patient using any DME equipment during [address]? no    Depression Screening:  3 most recent PHQ Screens 2/11/2021 8/5/2020 1/23/2019   PHQ Not Done - Patient Decline -   Little interest or pleasure in doing things Not at all - Several days   Feeling down, depressed, irritable, or hopeless Not at all - More than half the days   Total Score PHQ 2 0 - 3   Trouble falling or staying asleep, or sleeping too much - - More than half the days   Feeling tired or having little energy - - More than half the days   Poor appetite, weight loss, or overeating - - More than half the days   Feeling bad about yourself - or that you are a failure or have let yourself or your family down - - Not at all   Trouble concentrating on things such as school, work, reading, or watching TV - - More than half the days   Moving or speaking so slowly that other people could have noticed; or the opposite being so fidgety that others notice - - Not at all   Thoughts of being better off dead, or hurting yourself in some way - - Not at all   PHQ 9 Score - - 11   How difficult have these problems made it for you to do your work, take care of your home and get along with others - - Not difficult at all       Learning Assessment:  No flowsheet data found. Abuse Screening:  No flowsheet data found. Fall Risk  No flowsheet data found. Advance Directive:  1. Do you have an advance directive in place? Patient Reply:no    2. If not, would you like material regarding how to put one in place? Patient Reply: no    2. Per patient no changes to their ACP contact no. Coordination of Care:  1. Have you been to the ER, urgent care clinic since your last visit? Hospitalized since your last visit? no    2.  Have you seen or consulted any other health care providers outside of the [facility] [address] since your last visit? Include any pap smears or colon screening.  no

## 2022-02-14 NOTE — PROGRESS NOTES
Chief complaint   Chief Complaint   Patient presents with    Follow-up       History of Present Illness:  Linell Landau is a  59 y.o.  female comes in today for follow-up for chronic neck pain. She gets pain that radiates into her right upper extremity and has numbness and tingling. She gets bilateral lower extremity pain from her knees down to her feet. Her PCP told her she had neuropathy. She also gets muscle spasms and takes Robaxin 500 mg once at bedtime. She states it really does not help and she is afraid to double it. She has also had 2 cervical epidural steroid injections by Dr. Placido Chua which did help eventually. She is diabetic and her blood sugar runs about 110 when she checks it. She also uses Voltaren gel which is helpful. Last visit we increased her Lyrica from 200-2 25 twice a day. She states initially it did help but now feels like it really is not helping that much. She denies fever bowel bladder dysfunction. Physical Exam: The patient is a 78-year-old female well-developed well-nourished who is alert and oriented with a normal mood and affect. She has a full weightbearing antalgic gait. She did not use any assistive device. She has 4 out of 5 strength bilateral upper and lower extremities. Negative traight leg raise. Negative Genie's. Assessment and Plan: This is a patient who has cervical spinal stenosis and spondylosis. We will increase her Lyrica to 300 mg twice a day. She knows this is the max dose. I will change her Robaxin to 750 mg at bedtime for muscle spasms. I will order an EMG of her bilateral lower extremities. We will see her back in 3 months sooner if needed. Medications:  Current Outpatient Medications   Medication Sig Dispense Refill    pregabalin (LYRICA) 300 mg capsule Take 1 Capsule by mouth two (2) times a day.  Max Daily Amount: 600 mg. 60 Capsule 1    methocarbamoL (ROBAXIN) 750 mg tablet Take 1 Tablet by mouth nightly as needed for Muscle Spasm(s). 30 Tablet 2    diclofenac (VOLTAREN) 1 % gel Apply 2 g to affected area daily as needed.  zinc 50 mg tab tablet Take  by mouth daily.  pregabalin (LYRICA) 225 mg capsule Take 1 Capsule by mouth two (2) times a day. Max Daily Amount: 450 mg. 60 Capsule 2    methocarbamoL (Robaxin) 500 mg tablet Take 2 Tablets by mouth every six (6) hours as needed for Muscle Spasm(s). 30 Tablet 2    cetirizine (ZyrTEC) 10 mg tablet Take 10 mg by mouth daily as needed.  levothyroxine (Synthroid) 75 mcg tablet Take  by mouth Daily (before breakfast).  atorvastatin (LIPITOR) 40 mg tablet Take  by mouth daily.  aspirin delayed-release 81 mg tablet Take  by mouth daily.  naproxen sodium (Aleve) 220 mg tablet Take 220 mg by mouth two (2) times daily as needed.  hydroCHLOROthiazide (HYDRODIURIL) 12.5 mg tablet Take 12.5 mg by mouth daily.  psyllium husk/aspartame (METAMUCIL FIBER SINGLES PO) Take  by mouth daily as needed.  metFORMIN (GLUCOPHAGE) 1,000 mg tablet Take 1 Tab by mouth two (2) times daily (with meals). 60 Tab 0    dicyclomine (BENTYL) 20 mg tablet Take 1 Tab by mouth every six (6) hours as needed for Abdominal Cramps. (Patient not taking: Reported on 2/14/2022) 120 Tab 3    fluticasone (FLOVENT DISKUS) 100 mcg/actuation dsdv Take  by inhalation as needed.  (Patient not taking: Reported on 2/14/2022)             Review of systems:    Past Medical History:   Diagnosis Date    Arthritis     Asthma     Cyst of left kidney     [facility] ([facility]     Diverticulosis     Gross hematuria     Heart murmur     Hyperlipidemia     Hypertension     Hypothyroid     Hypothyroidism     Incomplete bladder emptying     Incontinence     Kidney stone     Ovarian cyst, right     Renal cyst     Sinusitis     Type 2 diabetes mellitus (HCC)     UTI (urinary tract infection)      Past Surgical History:   Procedure Laterality Date    COLONOSCOPY N/A 8/12/2020 DIAGNOSTIC [facility]COPY [facility]anne Collet[facility] at [facility] ENDOSCOPY    HX HYSTERECTOMY      HX OVARIAN CYST REMOVAL Right     HX TONSILLECTOMY       Social History     Socioeconomic History    Marital status:      Spouse name: Not on file    Number of children: Not on file    Years of education: Not on file    Highest education level: Not on file   Occupational History    Not on file   Tobacco Use    Smoking status: Never Smoker    Smokeless tobacco: Never Used   Substance and Sexual Activity    Alcohol use: Never    Drug use: Never    Sexual activity: Never   Other Topics Concern    Not on file   Social History Narrative    ** Merged History Encounter **          Social Determinants of Health     Financial Resource Strain:     Difficulty of Paying Living Expenses: Not on file   Food Insecurity:     Worried About Running Out of Food in the Last Year: Not on file    Blake of Food in the Last Year: Not on file   Transportation Needs:     Lack of Transportation (Medical): Not on file    Lack of Transportation (Non-Medical):  Not on file   Physical Activity:     Days of Exercise per Week: Not on file    Minutes of Exercise per Session: Not on file   Stress:     Feeling of Stress : Not on file   Social Connections:     Frequency of Communication with Friends and Family: Not on file    Frequency of Social Gatherings with Friends and Family: Not on file    Attends R[employer]arian Services: Not on file    Active Member of Clubs or Organizations: Not on file    Attends Club or Organization Meetings: Not on file    Marital Status: Not on file   Intimate Partner Violence:     Fear of Current or Ex-Partner: Not on file    Emotionally Abused: Not on file    Physically Abused: Not on file    Sexually Abused: Not on file   Housing Stability:     Unable to Pay for Housing in the Last Year: Not on file    Number of [place] in the Last Year: Not on file    Unstable Housing in the Last Year: Not on file     Family History   Problem Relation Age of Onset    Heart Disease Mother     Stroke Father     Other Other         Epilepsy       Physical Exam:  Visit Vitals  /77 (BP 1 Location: Right arm, BP Patient Position: Sitting, BP Cuff Size: Adult)   Pulse (!) 58   Temp 97.1 °F (36.2 °C) (Tympanic)   Resp 16   Ht 5' 4\" (1.626 m)   Wt 224 lb (101.6 kg)   SpO2 98%   BMI 38.45 kg/m²     Pain Scale: 6/10       has been . reviewed and is appropriate          Diagnoses and all orders for this visit:    1. Bilateral leg paresthesia  -     EMG TWO EXTREMITIES LOWER; Future  -     pregabalin (LYRICA) 300 mg capsule; Take 1 Capsule by mouth two (2) times a day. Max Daily Amount: 600 mg.    2. Cervical pain  -     pregabalin (LYRICA) 300 mg capsule; Take 1 Capsule by mouth two (2) times a day. Max Daily Amount: 600 mg.    3. Cervical spondylosis  -     pregabalin (LYRICA) 300 mg capsule; Take 1 Capsule by mouth two (2) times a day. Max Daily Amount: 600 mg.    4. Cervical radiculopathy  -     pregabalin (LYRICA) 300 mg capsule; Take 1 Capsule by mouth two (2) times a day. Max Daily Amount: 600 mg.    5. Cervical stenosis of spinal canal  -     pregabalin (LYRICA) 300 mg capsule; Take 1 Capsule by mouth two (2) times a day. Max Daily Amount: 600 mg.    6. Muscle spasm  -     methocarbamoL (ROBAXIN) 750 mg tablet; Take 1 Tablet by mouth nightly as needed for Muscle Spasm(s). Follow-up and Dispositions    · Return in about 3 months (around 5/14/2022) for with TELLY Mcmillan.              We have informed Barbara Argueta to notify us for immediate appointment if she has any worsening neurogical symptoms or if an emergency situation presents, then call [phone]

## 2022-03-02 ENCOUNTER — OFFICE VISIT (OUTPATIENT)
Dept: ORTHOPEDIC SURGERY | Age: 62
End: 2022-03-02
Payer: MEDICAID

## 2022-03-02 VITALS
WEIGHT: 219 LBS | HEART RATE: 50 BPM | OXYGEN SATURATION: 100 % | DIASTOLIC BLOOD PRESSURE: 69 MMHG | BODY MASS INDEX: 37.39 KG/M2 | SYSTOLIC BLOOD PRESSURE: 132 MMHG | HEIGHT: 64 IN | TEMPERATURE: 97.3 F

## 2022-03-02 DIAGNOSIS — R94.131 ABNORMAL EMG: ICD-10-CM

## 2022-03-02 DIAGNOSIS — R20.0 NUMBNESS AND TINGLING OF BOTH LOWER EXTREMITIES: Primary | ICD-10-CM

## 2022-03-02 DIAGNOSIS — R20.0 NUMBNESS AND TINGLING OF BOTH LOWER EXTREMITIES: ICD-10-CM

## 2022-03-02 DIAGNOSIS — R20.2 NUMBNESS AND TINGLING OF BOTH LOWER EXTREMITIES: Primary | ICD-10-CM

## 2022-03-02 DIAGNOSIS — R20.2 NUMBNESS AND TINGLING OF BOTH LOWER EXTREMITIES: ICD-10-CM

## 2022-03-02 PROCEDURE — 95886 MUSC TEST DONE W/N TEST COMP: CPT | Performed by: PHYSICAL MEDICINE & REHABILITATION

## 2022-03-02 PROCEDURE — 95910 NRV CNDJ TEST 7-8 STUDIES: CPT | Performed by: PHYSICAL MEDICINE & REHABILITATION

## 2022-03-02 NOTE — PROGRESS NOTES
Karla Oliver presents today for   Chief Complaint   Patient presents with    Procedure     EMG BLE       Is someone accompanying this pt? no    Is the patient using any DME equipment during OV? no    Depression Screening:  3 most recent PHQ Screens 2/11/2021   PHQ Not Done -   Little interest or pleasure in doing things Not at all   Feeling down, depressed, irritable, or hopeless Not at all   Total Score PHQ 2 0   Trouble falling or staying asleep, or sleeping too much -   Feeling tired or having little energy -   Poor appetite, weight loss, or overeating -   Feeling bad about yourself - or that you are a failure or have let yourself or your family down -   Trouble concentrating on things such as school, work, reading, or watching TV -   Moving or speaking so slowly that other people could have noticed; or the opposite being so fidgety that others notice -   Thoughts of being better off dead, or hurting yourself in some way -   PHQ 9 Score -   How difficult have these problems made it for you to do your work, take care of your home and get along with others -       Learning Assessment:  Learning Assessment 3/2/2022   PRIMARY LEARNER Patient   PRIMARY LANGUAGE ENGLISH   LEARNER PREFERENCE PRIMARY DEMONSTRATION   ANSWERED BY patient   RELATIONSHIP SELF       Abuse Screening:  Abuse Screening Questionnaire 3/2/2022   Do you ever feel afraid of your partner? N   Are you in a relationship with someone who physically or mentally threatens you? N   Is it safe for you to go home? Y       Fall Risk  Fall Risk Assessment, last 12 mths 3/2/2022   Able to walk? Yes   Fall in past 12 months? 0   Do you feel unsteady? 0   Are you worried about falling 0         Coordination of Care:  1. Have you been to the ER, urgent care clinic since your last visit? no  Hospitalized since your last visit? no    2.  Have you seen or consulted any other health care providers outside of the [address] [place] [address] since your last visit? no Include any pap smears or colon screening.  no

## 2022-03-02 NOTE — Clinical Note
3/2/2022    Patient: Delmar Paulson   YOB: 1960   Date of Visit: 3/2/2022     Haresh Alvarez MD  [address] [place] [address]   [address] [place] [address] [address], [place]  [address]  [address]    Dear MD Steven Trent NP,      Thank you for referring Ms. Delmar Paulson to [facility] for evaluation. My notes for this consultation are attached. If you have questions, please do not hesitate to call me. I look forward to following your patient along with you.       Sincerely,    Logan Cárdenas MD

## 2022-03-02 NOTE — PROGRESS NOTES
[address]  [address]  [address]  Phone: [phone]  Fax: [phone]        Christopher Decker  : 1960  PCP: Maria Dolores Sampson MD  3/2/2022    ELECTROMYOGRAPHY AND NERVE CONDUCTION STUDIES    Johanna Kirkland was referred by Renaee Lesches, NP for electrodiagnostic evaluation of BLE paraesthesia. NCV & EMG Findings:  Evaluation of the left Fibular motor nerve showed decreased conduction velocity (B Fib-Ankle, 13 m/s). The left tibial motor and the right tibial motor nerves showed reduced amplitude (L3.1, R2.5 mV). The left sural sensory and the right sural sensory nerves showed no response (Calf). All remaining nerves (as indicated in the following tables) were within normal limits. Left vs. Right side comparison data for the Fibular motor nerve indicates abnormal L-R velocity difference (B Fib-Ankle, 34 m/s). All remaining left vs. right side differences were within normal limits. All examined muscles (as indicated in the following table) showed no evidence of electrical instability. INTERPRETATION    This is an abnormal electrodiagnostic examination. These findings may be consistent with:   1. Mild sensorimotor peripheral polyneuropathy - this is based on lack of response from the sural nerves and low amplitude tibial responses. There are no electrodiagnostic findings consistent with lumbar radiculopathy, lumbosacral plexopathy, or myopathy. CLINICAL INTERPRETATION    Her electrodiagnostic finding of peripheral neuropathy are consistent with some of her bilateral leg symptoms and history of DM. HISTORY OF PRESENT ILLNESS  Johanna Kirkland is a 59 y.o. female. Pt presents today for BLE EMG evaluation of numbness and tingling from her knees distally to the feet.  PmHx: DM    PAST MEDICAL HISTORY   Past Medical History:   Diagnosis Date    Arthritis     Asthma     Cyst of left kidney     Diabetes ([address]     Diverticulosis     Gross hematuria     Heart murmur     Hyperlipidemia     Hypertension     Hypothyroid     Hypothyroidism     Incomplete bladder emptying     Incontinence     Kidney stone     Ovarian cyst, right     Renal cyst     Sinusitis     Type 2 diabetes mellitus (HCC)     UTI (urinary tract infection)        Past Surgical History:   Procedure Laterality Date    COLONOSCOPY N/A 8/12/2020    DIAGNOSTIC COLONOSCOPY performed by Aysha Martell MD at [facility] ENDOSCOPY    HX HYSTERECTOMY      HX OVARIAN CYST REMOVAL Right     HX TONSILLECTOMY     . MEDICATIONS    Current Outpatient Medications   Medication Sig Dispense Refill    pregabalin (LYRICA) 300 mg capsule Take 1 Capsule by mouth two (2) times a day. Max Daily Amount: 600 mg. 60 Capsule 1    methocarbamoL (ROBAXIN) 750 mg tablet Take 1 Tablet by mouth nightly as needed for Muscle Spasm(s). 30 Tablet 2    diclofenac (VOLTAREN) 1 % gel Apply 2 g to affected area daily as needed.  zinc 50 mg tab tablet Take  by mouth daily.  pregabalin (LYRICA) 225 mg capsule Take 1 Capsule by mouth two (2) times a day. Max Daily Amount: 450 mg. 60 Capsule 2    methocarbamoL (Robaxin) 500 mg tablet Take 2 Tablets by mouth every six (6) hours as needed for Muscle Spasm(s). 30 Tablet 2    cetirizine (ZyrTEC) 10 mg tablet Take 10 mg by mouth daily as needed.  dicyclomine (BENTYL) 20 mg tablet Take 1 Tab by mouth every six (6) hours as needed for Abdominal Cramps. (Patient not taking: Reported on 2/14/2022) 120 Tab 3    levothyroxine (Synthroid) 75 mcg tablet Take  by mouth Daily (before breakfast).  atorvastatin (LIPITOR) 40 mg tablet Take  by mouth daily.  aspirin delayed-release 81 mg tablet Take  by mouth daily.  naproxen sodium (Aleve) 220 mg tablet Take 220 mg by mouth two (2) times daily as needed.       hydroCHLOROthiazide (HYDRODIURIL) 12.5 mg tablet Take 12.5 mg by mouth daily.      psyllium husk/aspartame (METAMUCIL FIBER SINGLES PO) Take  by mouth daily as needed.  metFORMIN (GLUCOPHAGE) 1,000 mg tablet Take 1 Tab by mouth two (2) times daily (with meals). 60 Tab 0    fluticasone (FLOVENT DISKUS) 100 mcg/actuation dsdv Take  by inhalation as needed. (Patient not taking: Reported on 2/14/2022)          ALLERGIES  Allergies   Allergen Reactions    Naproxen Nausea and Vomiting    Percocet [Oxycodone-Acetaminophen] Nausea and Vomiting    Tramadol Nausea Only and Vertigo     Not allergic, just don't tolerate well.  Tramadol Nausea and Vomiting     Not allergic just don't tolerate it well          SOCIAL HISTORY    Social History     Socioeconomic History    Marital status:    Tobacco Use    Smoking status: Never Smoker    Smokeless tobacco: Never Used   Substance and Sexual Activity    Alcohol use: Never    Drug use: Never    Sexual activity: Never   Social History Narrative    ** Merged History Encounter **            FAMILY HISTORY  Family History   Problem Relation Age of Onset    Heart Disease Mother     Stroke Father     Other Other         Epilepsy         PHYSICAL EXAMINATION  Visit Vitals  /69 (BP 1 Location: Right upper arm, BP Patient Position: Sitting, BP Cuff Size: Large adult)   Pulse (!) 50 Comment: pt asymptomatic, MD aware   Temp 97.3 °F (36.3 °C) (Skin)   Ht 5' 4\" (1.626 m)   Wt 219 lb (99.3 kg)   SpO2 100% Comment: RA   BMI 37.59 kg/m²       Pain Assessment  3/2/2022   Location of Pain Leg   Location Modifiers Left;Right   Severity of Pain 5   Quality of Pain Other (Comment); Sharp   Quality of Pain Comment n/t   Duration of Pain Persistent   Frequency of Pain Constant   Date Pain First Started -   Date Pain First Started Comment -   Aggravating Factors Other (Comment)   Aggravating Factors Comment being up on feet all day   Limiting Behavior Yes   Relieving Factors Rest;Elevation; Other (Comment)   Relieving Factors Comment lyrica Result of Injury No           Constitutional:  Well developed, well nourished, in no acute distress. Psychiatric: Affect and mood are appropriate. Integumentary: No rashes or abrasions noted on exposed areas. SPINE/MUSCULOSKELETAL EXAM    On brief examination: None.       NCV & EMG Findings:  Nerve Conduction Studies  Anti Sensory Summary Table     Stim Site NR Peak (ms) Norm Peak (ms) O-P Amp (µV) Norm O-P Amp Site1 Site2 Delta-P (ms) Dist (cm) Duke (m/s) Norm Duke (m/s)   Left Sup Fibular Anti Sensory (Ant Lat Mall)   14 cm    3.5 <4.4 18.7 >5.0 14 cm Ant Lat Mall 3.5 14.0 40 >32   Right Sup Fibular Anti Sensory (Ant Lat Mall)   14 cm    3.0 <4.4 20.7 >5.0 14 cm Ant Lat Mall 3.0 14.0 47 >32   Left Sural Anti Sensory (Lat Mall)   Calf NR  <4.5  >4.0 Calf Lat Mall  14.0  >35   Right Sural Anti Sensory (Lat Mall)   Calf NR  <4.5  >4.0 Calf Lat Mall  14.0  >35     Motor Summary Table     Stim Site NR Onset (ms) Norm Onset (ms) O-P Amp (mV) Norm O-P Amp Site1 Site2 Delta-0 (ms) Dist (cm) Duke (m/s) Norm Duke (m/s)   Left Fibular Motor (Ext Dig Brev)   Ankle    3.9 <6.5 4.4 >1.3 B Fib Ankle 6.2 8.0 13 >38   B Fib    10.1  3.7  Poplt B Fib 1.1 0.0  >40   Poplt    11.2  3.7          Right Fibular Motor (Ext Dig Brev)   Ankle    4.3 <6.5 4.3 >1.3 B Fib Ankle 5.8 27.0 47 >38   B Fib    10.1  3.4  Poplt B Fib 1.2 8.0 67 >40   Poplt    11.3  3.6          Left Tibial Motor (Abd Gould Brev)   Ankle    4.9 <6.1 3.1 >4.4 Knee Ankle 8.1 35.0 43 >39   Knee    13.0  2.5          Right Tibial Motor (Abd Gould Brev)   Ankle    4.4 <6.1 2.5 >4.4 Knee Ankle 7.7 35.0 45 >39   Knee    12.1  2.3            EMG     Side Muscle Nerve Root Ins Act Fibs Psw Amp Dur Poly Recrt Int Lenord Medico Comment   Right VastusMed Femoral L2-4 Nml Nml Nml Nml Nml 0 Nml Nml    Right AntTibialis Dp Br Fibular L4-5 Nml Nml Nml Nml Nml 0 Nml Nml    Right Gastroc Tibial S1-2 Nml Nml Nml Nml Nml 0 Nml Nml    Left VastusMed Femoral L2-4 Nml Nml Nml Nml Nml 0 Nml Nml Left AntTibialis Dp Br Fibular L4-5 Nml Nml Nml Nml Nml 0 Nml Nml    Left Gastroc Tibial S1-2 Nml Nml Nml Nml Nml 0 Nml Nml    Left ExtHallLong Dp Br Fibular L5, S1 Nml Nml Nml Nml Nml 0 Nml Nml    Left PostTibialis Tibial L5, S1 Nml Nml Nml Nml Nml 0 Nml Nml    Right ExtHallLong Dp Br Fibular L5, S1 Nml Nml Nml Nml Nml 0 Nml Nml    Right PostTibialis Tibial L5, S1 Nml Nml Nml Nml Nml 0 Nml Nml    Right Lumbo Parasp Up Rami L1-2 Nml Nml Nml         Right Lumbo Parasp Mid Rami L3-4 Nml Nml Nml         Right Lumbo Parasp Low Rami L5-S1 Nml Nml Nml         Left Lumbo Parasp Up Rami L1-2 Nml Nml Nml         Left Lumbo Parasp Mid Rami L3-4 Nml Nml Nml         Left Lumbo Parasp Low Rami L5-S1 Nml Nml Nml             Nerve Conduction Studies  Anti Sensory Left/Right Comparison     Stim Site L Lat (ms) R Lat (ms) L-R Lat (ms) L Amp (µV) R Amp (µV) L-R Amp (%) Site1 Site2 L Duke (m/s) R Duke (m/s) L-R Duke (m/s)   Sup Fibular Anti Sensory (Ant Lat Mall)   14 cm 3.5 3.0 0.5 18.7 20.7 9.7 14 cm Ant Lat Mall 40 47 7   Sural Anti Sensory (Lat Mall)   Calf       Calf Lat Mall        Motor Left/Right Comparison     Stim Site L Lat (ms) R Lat (ms) L-R Lat (ms) L Amp (mV) R Amp (mV) L-R Amp (%) Site1 Site2 L Duke (m/s) R Udke (m/s) L-R Duke (m/s)   Fibular Motor (Ext Dig Brev)   Ankle 3.9 4.3 0.4 4.4 4.3 2.3 B Fib Ankle 13 47 34   B Fib 10.1 10.1 0.0 3.7 3.4 8.1 Poplt B Fib  67    Poplt 11.2 11.3 0.1 3.7 3.6 2.7        Tibial Motor (Abd Gould Brev)   Ankle 4.9 4.4 0.5 3.1 2.5 19.4 Knee Ankle 43 45 2   Knee 13.0 12.1 0.9 2.5 2.3 8.0              Waveforms:                            [facility]  OFFICE PROCEDURE PROGRESS NOTE        Chart reviewed for the following:   Jose MCGHEE, have reviewed the History, Physical and updated the Allergic reactions for Johanna Taveras     TIME OUT performed immediately prior to start of procedure:   Jose MCGHEE, have performed the following reviews on Dale China prior to the start of the procedure:            * Patient was identified by name and date of birth   * Agreement on procedure being performed was verified  * Risks and Benefits explained to the patient  * Procedure site verified and marked as necessary  * Patient was positioned for comfort  * Consent was signed and verified     Time: 11:49 AM    Date of procedure: 3/2/2022    Procedure performed by:  Alex Hansen MD    Provider accompanied by: Jigaribnathan. Patient accompanied by: Self.     How tolerated by patient: tolerated the procedure well with no complications    Post Procedural Pain Scale: 0 - No Hurt    Comments: none    Written by Mushtaq Weiss, [address] as dictated by Nato Randall MD

## 2022-03-07 ENCOUNTER — VIRTUAL VISIT (OUTPATIENT)
Dept: ORTHOPEDIC SURGERY | Age: 62
End: 2022-03-07
Payer: MEDICAID

## 2022-03-07 DIAGNOSIS — G62.9 PERIPHERAL POLYNEUROPATHY: Primary | ICD-10-CM

## 2022-03-07 PROCEDURE — 99443 PR PHYS/QHP TELEPHONE EVALUATION 21-30 MIN: CPT | Performed by: NURSE PRACTITIONER

## 2022-03-07 NOTE — PROGRESS NOTES
History of Present Illness:    Consent: Hailey Jorge, who was seen by telephone call and/or her healthcare decision maker, is aware that this patient-initiated, Telehealth encounter on 3/7/2022 is a billable service, with coverage as determined by her insurance carrier. She is aware that she may receive a bill and has provided verbal consent to proceed: Yes. The provider was located at the [facility] office and the patient was located at their home. No one else participated in the visit with the patient. The platform used was doxy. me     Hailey Jorge is a  59 y.o.  female who was seen today for review of her bilateral lower extremity EMG done on 3/2/22 with Dr. Brittany Estrada. She gets bilateral lower extremity pain from her knees down to her feet. Last visit we increase her lyrica to 300 mg bid. She states that has helped a little bit. She is diabetic and her blood sugar runs about 110 when she checks it. She also uses Voltaren gel which is helpful. The EMG demonstrates bilateral lower extremity sensorimotor peripheral neuropathy. It is likely due to her diabetes. She denies fever bowel bladder dysfunction. Physical Exam: The patient is a 70-year-old female who is alert and oriented. She spoke with fluency. She did not appear to be distressed. Assessment & Plan: This is a patient who has bilateral peripheral neuropathy likely due to diabetes. She will continue her Lyrica 300 mg twice a day. She has an appointment on May 11th and I will see her back at that time. Depending on her response to Lyrica we may be able to add Cymbalta on if needed. Diagnoses and all orders for this visit:    1. Peripheral polyneuropathy, EDX confirmed UEs, 6/2020          Pain Scale: /10              We discussed the expected course, resolution and complications of the diagnosis(es) in detail. Medication risks, benefits, costs, interactions, and alternatives were discussed as indicated.   I advised her to contact the office if her condition worsens, changes or fails to improve as anticipated. For emergencies or worsening neurological symptoms the patient was instructed to call 911. She expressed understanding with the diagnosis(es) and plan. Follow-up and Dispositions    · Return for 5/11/22 as scheduled. 1    Johanna Perez is a 59 y.o. female being evaluated by a video visit encounter for concerns as above. A caregiver was present when appropriate. Due to this being a TeleHealth encounter (During NCTMZ-82 public health emergency), evaluation of the following organ systems was limited: Vitals/Constitutional/EENT/Resp/CV/GI//MS/Neuro/Skin/Heme-Lymph-Imm. Pursuant to the emergency declaration under the [address] [place] [address], [address] [place] [address] authority and the [employer] Act, this Virtual  Visit was conducted, with patient's (and/or legal guardian's) consent, to reduce the patient's risk of exposure to COVID-19 and provide necessary medical care. CPT Codes 29751-37932 for Established Patients may apply to this Telehealth Visit  Time-based coding, delete if not needed: I spent at least 15 minutes with this established patient, and >50% of the time was spent counseling and/or coordinating care regarding Peripheral neuropathy  Start time: 11:30 AM and Stop time: 11:53 AM.        Due to this being a TeleHealth evaluation, many elements of the physical examination are unable to be assessed. Pursuant to the emergency declaration under the [address] [place] [address], [facility] authority and the [employer] Act, this Virtual  Visit was conducted, with patient's consent, to reduce the patient's risk of exposure to COVID-19 and provide continuity of care for an established patient.      Services were provided through a video synchronous discussion virtually to substitute for in-person clinic visit.     Amrita Gomez, NP

## 2022-03-18 PROBLEM — R94.31 ABNORMAL EKG: Status: ACTIVE | Noted: 2019-01-23

## 2022-03-19 PROBLEM — R93.0 ABNORMAL CT OF THE HEAD: Status: ACTIVE | Noted: 2019-01-23

## 2022-03-19 PROBLEM — R79.89 ELEVATED TSH: Status: ACTIVE | Noted: 2019-01-23

## 2022-03-19 PROBLEM — E66.01 SEVERE OBESITY (HCC): Status: ACTIVE | Noted: 2020-06-24

## 2022-03-19 PROBLEM — R42 LIGHTHEADEDNESS: Status: ACTIVE | Noted: 2019-01-23

## 2022-05-11 ENCOUNTER — OFFICE VISIT (OUTPATIENT)
Dept: ORTHOPEDIC SURGERY | Age: 62
End: 2022-05-11
Payer: MEDICAID

## 2022-05-11 VITALS
TEMPERATURE: 96.8 F | DIASTOLIC BLOOD PRESSURE: 76 MMHG | SYSTOLIC BLOOD PRESSURE: 135 MMHG | BODY MASS INDEX: 39.09 KG/M2 | WEIGHT: 229 LBS | HEIGHT: 64 IN | OXYGEN SATURATION: 99 % | HEART RATE: 45 BPM

## 2022-05-11 DIAGNOSIS — M62.838 MUSCLE SPASM: ICD-10-CM

## 2022-05-11 DIAGNOSIS — G62.9 PERIPHERAL POLYNEUROPATHY: ICD-10-CM

## 2022-05-11 DIAGNOSIS — M54.50 LUMBAR PAIN: ICD-10-CM

## 2022-05-11 DIAGNOSIS — M25.561 CHRONIC PAIN OF BOTH KNEES: Primary | ICD-10-CM

## 2022-05-11 DIAGNOSIS — G89.29 CHRONIC PAIN OF BOTH KNEES: Primary | ICD-10-CM

## 2022-05-11 DIAGNOSIS — R20.2 BILATERAL LEG PARESTHESIA: ICD-10-CM

## 2022-05-11 DIAGNOSIS — M25.562 CHRONIC PAIN OF BOTH KNEES: Primary | ICD-10-CM

## 2022-05-11 PROCEDURE — 99214 OFFICE O/P EST MOD 30 MIN: CPT | Performed by: NURSE PRACTITIONER

## 2022-05-11 RX ORDER — DULOXETIN HYDROCHLORIDE 20 MG/1
20 CAPSULE, DELAYED RELEASE ORAL DAILY
Qty: 30 CAPSULE | Refills: 1 | Status: SHIPPED | OUTPATIENT
Start: 2022-05-11 | End: 2022-08-22 | Stop reason: SDUPTHER

## 2022-05-11 RX ORDER — METHOCARBAMOL 750 MG/1
750 TABLET, FILM COATED ORAL
Qty: 30 TABLET | Refills: 2 | Status: SHIPPED | OUTPATIENT
Start: 2022-05-11 | End: 2022-08-22 | Stop reason: SDUPTHER

## 2022-05-11 RX ORDER — PREGABALIN 300 MG/1
300 CAPSULE ORAL 2 TIMES DAILY
Qty: 60 CAPSULE | Refills: 1 | Status: SHIPPED | OUTPATIENT
Start: 2022-05-11 | End: 2022-09-21 | Stop reason: SDUPTHER

## 2022-05-11 NOTE — PROGRESS NOTES
Chief complaint   Chief Complaint   Patient presents with    Neck Pain    Shoulder Pain     right    Arm Pain     right       History of Present Illness:  Saeed Nayak is a  59 y.o.  female who comes in today for follow-up of her chronic low back pain with bilateral lower extremity pain down to her feet. She had a EMG done in March that showed bilateral sensorimotor peripheral neuropathy. We increased her Lyrica to 300 mg twice a day. She states it does help some but she still has significant pain in her legs. She is diabetic and her blood sugars running between 140 and 160. She also takes Robaxin-750 milligrams at night which she states does help with the tightness in her back. She has failed gabapentin in the past.  Today she is also complaining of left greater than right knee pain. She denies fever bowel bladder dysfunction. Physical Exam: The patient is a 58-year-old female well-developed well-nourished who is alert and oriented with a normal mood and affect. She has a full weightbearing nonantalgic gait. She did not use any assist device. She has 4 out of 5 strength bilateral lower extremities. Negative straight leg raise. She has some pain with hyperextension of her spine. Assessment and Plan: This is a patient who has back pain with central and lateral peripheral neuropathy that gives her pain relief her legs. She also complains of some bilateral knee pain. We will continue her Lyrica 300 twice a day. I have refilled that and her Robaxin. I will add Cymbalta 20 mg daily. Hopefully this will help with her neuropathy. I will refer her to Dr. Darlin Lora for her bilateral knee pain. We will see her back in 2 months sooner if needed. Medications:  Current Outpatient Medications   Medication Sig Dispense Refill    DULoxetine (CYMBALTA) 20 mg capsule Take 1 Capsule by mouth daily.  30 Capsule 1    pregabalin (LYRICA) 300 mg capsule Take 1 Capsule by mouth two (2) times a day. Max Daily Amount: 600 mg. 60 Capsule 1    methocarbamoL (ROBAXIN) 750 mg tablet Take 1 Tablet by mouth nightly as needed for Muscle Spasm(s). 30 Tablet 2    diclofenac (VOLTAREN) 1 % gel Apply 2 g to affected area daily as needed.  zinc 50 mg tab tablet Take  by mouth daily.  cetirizine (ZyrTEC) 10 mg tablet Take 10 mg by mouth daily as needed.  levothyroxine (Synthroid) 75 mcg tablet Take  by mouth Daily (before breakfast).  atorvastatin (LIPITOR) 40 mg tablet Take  by mouth daily.  aspirin delayed-release 81 mg tablet Take 81 mg by mouth daily.  hydroCHLOROthiazide (HYDRODIURIL) 12.5 mg tablet Take 12.5 mg by mouth daily.  metFORMIN (GLUCOPHAGE) 1,000 mg tablet Take 1 Tab by mouth two (2) times daily (with meals). (Patient taking differently: Take 1,000 mg by mouth daily.) 60 Tab 0    fluticasone (FLOVENT DISKUS) 100 mcg/actuation dsdv Take  by inhalation as needed.              Review of systems:    Past Medical History:   Diagnosis Date    Arthritis     Asthma     Cyst of left kidney     Diabetes [address] [address]     Diverticulosis     Gross hematuria     Heart murmur     Hyperlipidemia     Hypertension     Hypothyroid     Hypothyroidism     Incomplete bladder emptying     Incontinence     Kidney stone     Ovarian cyst, right     Renal cyst     Sinusitis     Type 2 diabetes mellitus (HCC)     UTI (urinary tract infection)      Past Surgical History:   Procedure Laterality Date    COLONOSCOPY N/A 8/12/2020    DIAGNOSTIC COLONOSCOPY performed by Kinsey Dye MD at [facility] ENDOSCOPY    HX HYSTERECTOMY      HX OVARIAN CYST REMOVAL Right     HX TONSILLECTOMY       Social History     Socioeconomic History    Marital status:      Spouse name: Not on file    Number of children: Not on file    Years of education: Not on file    Highest education level: Not on file   Occupational History    Not on file   Tobacco Use    Smoking status: Never Smoker    Smokeless tobacco: Never Used   Substance and Sexual Activity    Alcohol use: Never    Drug use: Never    Sexual activity: Never   Other Topics Concern    Not on file   Social History Narrative    ** Merged History Encounter **          Social Determinants of Health     Financial Resource Strain:     Difficulty of Paying Living Expenses: Not on file   Food Insecurity:     Worried About Running Out of Food in the Last Year: Not on file    Blake of Food in the Last Year: Not on file   Transportation Needs:     Lack of Transportation (Medical): Not on file    Lack of Transportation (Non-Medical):  Not on file   Physical Activity:     Days of Exercise per Week: Not on file    Minutes of Exercise per Session: Not on file   Stress:     Feeling of Stress : Not on file   Social Connections:     Frequency of Communication with Friends and Family: Not on file    Frequency of Social Gatherings with Friends and Family: Not on file    Attends [employer] Services: Not on file    Active Member of [address] [place] [address] or Organizations: Not on file    Attends Club or Organization Meetings: Not on file    Marital Status: Not on file   Intimate Partner Violence:     Fear of Current or Ex-Partner: Not on file    Emotionally Abused: Not on file    Physically Abused: Not on file    Sexually Abused: Not on file   Housing Stability:     Unable to Pay for Housing in the Last Year: Not on file    Number of [place] in the Last Year: Not on file    Unstable Housing in the Last Year: Not on file     Family History   Problem Relation Age of Onset    Heart Disease Mother     Stroke Father     Other Other         Epilepsy       Physical Exam:  Visit Vitals  /76 (BP 1 Location: Right lower arm, BP Patient Position: Sitting, BP Cuff Size: Large adult)   Pulse (!) 45 Comment: pt asymptomatic, NP aware   Temp 96.8 °F (36 °C) (Tympanic)   Ht 5' 4\" (1.626 m)   Wt 229 lb (103.9 kg)   SpO2 99% Comment: RA   BMI 39.31 kg/m²     Pain Scale: 6/10       has been . reviewed and is appropriate          Diagnoses and all orders for this visit:    1. Chronic pain of both knees  -     REFERRAL TO ORTHOPEDICS    2. Peripheral polyneuropathy  -     DULoxetine (CYMBALTA) 20 mg capsule; Take 1 Capsule by mouth daily. -     pregabalin (LYRICA) 300 mg capsule; Take 1 Capsule by mouth two (2) times a day. Max Daily Amount: 600 mg.    3. Lumbar pain  -     DULoxetine (CYMBALTA) 20 mg capsule; Take 1 Capsule by mouth daily. -     pregabalin (LYRICA) 300 mg capsule; Take 1 Capsule by mouth two (2) times a day. Max Daily Amount: 600 mg.    4. Bilateral leg paresthesia  -     pregabalin (LYRICA) 300 mg capsule; Take 1 Capsule by mouth two (2) times a day. Max Daily Amount: 600 mg.    5. Muscle spasm  -     methocarbamoL (ROBAXIN) 750 mg tablet; Take 1 Tablet by mouth nightly as needed for Muscle Spasm(s). Follow-up and Dispositions    · Return in about 2 months (around 7/11/2022) for with TELLY Mcmillan. We have informed Oval Handing to notify us for immediate appointment if she has any worsening neurogical symptoms or if an emergency situation presents, then call 911    Please note that this dictation was completed with [employer] voice recognition software. Quite often unanticipated grammatical, syntax, homophones, and other interpretive errors are inadvertently transcribed by the computer software. Please disregard these errors. Please excuse any errors that have escaped final proofreading.

## 2022-05-11 NOTE — PROGRESS NOTES
Anabell Vaca presents today for   Chief Complaint   Patient presents with    Neck Pain    Shoulder Pain     right    Arm Pain     right       Is someone accompanying this pt? no    Is the patient using any DME equipment during OV? no    Depression Screening:  3 most recent PHQ Screens 2/11/2021   PHQ Not Done -   Little interest or pleasure in doing things Not at all   Feeling down, depressed, irritable, or hopeless Not at all   Total Score PHQ 2 0   Trouble falling or staying asleep, or sleeping too much -   Feeling tired or having little energy -   Poor appetite, weight loss, or overeating -   Feeling bad about yourself - or that you are a failure or have let yourself or your family down -   Trouble concentrating on things such as school, work, reading, or watching TV -   Moving or speaking so slowly that other people could have noticed; or the opposite being so fidgety that others notice -   Thoughts of being better off dead, or hurting yourself in some way -   PHQ 9 Score -   How difficult have these problems made it for you to do your work, take care of your home and get along with others -       Learning Assessment:  Learning Assessment 3/2/2022   PRIMARY LEARNER Patient   PRIMARY LANGUAGE ENGLISH   LEARNER PREFERENCE PRIMARY DEMONSTRATION   ANSWERED BY patient   RELATIONSHIP SELF       Abuse Screening:  Abuse Screening Questionnaire 3/2/2022   Do you ever feel afraid of your partner? N   Are you in a relationship with someone who physically or mentally threatens you? N   Is it safe for you to go home? Y       Fall Risk  Fall Risk Assessment, last 12 mths 3/2/2022   Able to walk? Yes   Fall in past 12 months? 0   Do you feel unsteady? 0   Are you worried about falling 0         Coordination of Care:  1. Have you been to the ER, urgent care clinic since your last visit? no  Hospitalized since your last visit? no    2.  Have you seen or consulted any other health care providers outside of the [address] [place] [address] since your last visit? Yes, pcp Include any pap smears or colon screening.  no

## 2022-07-11 ENCOUNTER — OFFICE VISIT (OUTPATIENT)
Dept: ORTHOPEDIC SURGERY | Age: 62
End: 2022-07-11
Payer: MEDICAID

## 2022-07-11 VITALS — TEMPERATURE: 97.3 F | WEIGHT: 216.6 LBS | BODY MASS INDEX: 36.98 KG/M2 | HEIGHT: 64 IN

## 2022-07-11 DIAGNOSIS — M17.12 PRIMARY OSTEOARTHRITIS OF LEFT KNEE: Primary | ICD-10-CM

## 2022-07-11 DIAGNOSIS — M25.561 CHRONIC PAIN OF RIGHT KNEE: ICD-10-CM

## 2022-07-11 DIAGNOSIS — G89.29 CHRONIC PAIN OF LEFT KNEE: ICD-10-CM

## 2022-07-11 DIAGNOSIS — M17.11 PRIMARY OSTEOARTHRITIS OF RIGHT KNEE: ICD-10-CM

## 2022-07-11 DIAGNOSIS — G89.29 CHRONIC PAIN OF RIGHT KNEE: ICD-10-CM

## 2022-07-11 DIAGNOSIS — M25.562 CHRONIC PAIN OF LEFT KNEE: ICD-10-CM

## 2022-07-11 PROCEDURE — 73560 X-RAY EXAM OF KNEE 1 OR 2: CPT | Performed by: ORTHOPAEDIC SURGERY

## 2022-07-11 PROCEDURE — 20611 DRAIN/INJ JOINT/BURSA W/US: CPT | Performed by: ORTHOPAEDIC SURGERY

## 2022-07-11 PROCEDURE — 99213 OFFICE O/P EST LOW 20 MIN: CPT | Performed by: ORTHOPAEDIC SURGERY

## 2022-07-11 RX ORDER — TRIAMCINOLONE ACETONIDE 40 MG/ML
40 INJECTION, SUSPENSION INTRA-ARTICULAR; INTRAMUSCULAR ONCE
Status: COMPLETED | OUTPATIENT
Start: 2022-07-11 | End: 2022-07-11

## 2022-07-11 RX ORDER — MELOXICAM 15 MG/1
15 TABLET ORAL
Qty: 30 TABLET | Refills: 1 | Status: SHIPPED | OUTPATIENT
Start: 2022-07-11

## 2022-07-11 RX ADMIN — TRIAMCINOLONE ACETONIDE 40 MG: 40 INJECTION, SUSPENSION INTRA-ARTICULAR; INTRAMUSCULAR at 16:38

## 2022-07-11 NOTE — PATIENT INSTRUCTIONS
Knee: Exercises  Introduction  Here are some examples of exercises for you to try. The exercises may be suggested for a condition or for rehabilitation. Start each exercise slowly. Ease off the exercises if you start to have pain. You will be told when to start these exercises and which ones will work best for you. How to do the exercises  Quad sets    1. Sit with your leg straight and supported on the floor or a firm bed. (If you feel discomfort in the front or back of your knee, place a small towel roll under your knee.)  2. Tighten the muscles on top of your thigh by pressing the back of your knee flat down to the floor. (If you feel discomfort under your kneecap, place a small towel roll under your knee.)  3. Hold for about 6 seconds, then rest for up to 10 seconds. 4. Do 8 to 12 repetitions several times a day. Straight-leg raises to the front    1. Lie on your back with your good knee bent so that your foot rests flat on the floor. Your injured leg should be straight. Make sure that your low back has a normal curve. You should be able to slip your flat hand in between the floor and the small of your back, with your palm touching the floor and your back touching the back of your hand. 2. Tighten the thigh muscles in the injured leg by pressing the back of your knee flat down to the floor. Hold your knee straight. 3. Keeping the thigh muscles tight, lift your injured leg up so that your heel is about 12 inches off the floor. Hold for about 6 seconds and then lower slowly. 4. Do 8 to 12 repetitions, 3 times a day. Straight-leg raises to the outside    1. Lie on your side, with your injured leg on top. 2. Tighten the front thigh muscles of your injured leg to keep your knee straight. 3. Keep your hip and your leg straight in line with the rest of your body, and keep your knee pointing forward. Do not drop your hip back.   4. Lift your injured leg straight up toward the ceiling, about 12 inches off the floor. Hold for about 6 seconds, then slowly lower your leg. 5. Do 8 to 12 repetitions. Straight-leg raises to the back    1. Lie on your stomach, and lift your leg straight up behind you (toward the ceiling). 2. Lift your toes about 6 inches off the floor, hold for about 6 seconds, then lower slowly. 3. Do 8 to 12 repetitions. Straight-leg raises to the inside    1. Lie on the side of your body with the injured leg. 2. You can either prop your other (good) leg up on a chair, or you can bend your good knee and put that foot in front of your injured knee. Do not drop your hip back. 3. Tighten the muscles on the front of your thigh to straighten your injured knee. 4. Keep your kneecap pointing forward, and lift your whole leg up toward the ceiling about 6 inches. Hold for about 6 seconds, then lower slowly. 5. Do 8 to 12 repetitions. Heel dig bridging    1. Lie on your back with both knees bent and your ankles bent so that only your heels are digging into the floor. Your knees should be bent about 90 degrees. 2. Then push your heels into the floor, squeeze your buttocks, and lift your hips off the floor until your shoulders, hips, and knees are all in a straight line. 3. Hold for about 6 seconds as you continue to breathe normally, and then slowly lower your hips back down to the floor and rest for up to 10 seconds. 4. Do 8 to 12 repetitions. Hamstring curls    1. Lie on your stomach with your knees straight. If your kneecap is uncomfortable, roll up a washcloth and put it under your leg just above your kneecap. 2. Lift the foot of your injured leg by bending the knee so that you bring the foot up toward your buttock. If this motion hurts, try it without bending your knee quite as far. This may help you avoid any painful motion. 3. Slowly lower your leg back to the floor. 4. Do 8 to 12 repetitions.   5. With permission from your doctor or physical therapist, you may also want to add a cuff weight to your ankle (not more than 5 pounds). With weight, you do not have to lift your leg more than 12 inches to get a hamstring workout. Shallow standing knee bends    Do this exercise only if you have very little pain; if you have no clicking, locking, or giving way if you have an injured knee; and if it does not hurt while you are doing 8 to 12 repetitions. 1. Stand with your hands lightly resting on a counter or chair in front of you. Put your feet shoulder-width apart. 2. Slowly bend your knees so that you squat down like you are going to sit in a chair. Make sure your knees do not go in front of your toes. 3. Lower yourself about 6 inches. Your heels should remain on the floor at all times. 4. Rise slowly to a standing position. Heel raises    1. Stand with your feet a few inches apart, with your hands lightly resting on a counter or chair in front of you. 2. Slowly raise your heels off the floor while keeping your knees straight. 3. Hold for about 6 seconds, then slowly lower your heels to the floor. 4. Do 8 to 12 repetitions several times during the day. Follow-up care is a key part of your treatment and safety. Be sure to make and go to all appointments, and call your doctor if you are having problems. It's also a good idea to know your test results and keep a list of the medicines you take. Where can you learn more? Go to http://www.gray.com/  Enter C534 in the search box to learn more about \"Knee: Exercises. \"  Current as of: July 1, 2021               Content Version: 13.2  © 5198-1493 [employer]. Care instructions adapted under license by [employer] (which disclaims liability or warranty for this information). If you have questions about a medical condition or this instruction, always ask your healthcare professional. [address] any warranty or liability for your use of this information.
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
## 2022-08-09 ENCOUNTER — OFFICE VISIT (OUTPATIENT)
Dept: ORTHOPEDIC SURGERY | Age: 62
End: 2022-08-09
Payer: MEDICAID

## 2022-08-09 VITALS — WEIGHT: 213.4 LBS | BODY MASS INDEX: 36.43 KG/M2 | TEMPERATURE: 96.9 F | HEIGHT: 64 IN

## 2022-08-09 DIAGNOSIS — M17.11 PRIMARY OSTEOARTHRITIS OF RIGHT KNEE: ICD-10-CM

## 2022-08-09 DIAGNOSIS — S83.8X2A MENISCAL INJURY, LEFT, INITIAL ENCOUNTER: Primary | ICD-10-CM

## 2022-08-09 DIAGNOSIS — M17.12 PRIMARY OSTEOARTHRITIS OF LEFT KNEE: ICD-10-CM

## 2022-08-09 PROCEDURE — 99213 OFFICE O/P EST LOW 20 MIN: CPT | Performed by: ORTHOPAEDIC SURGERY

## 2022-08-09 RX ORDER — MELOXICAM 15 MG/1
15 TABLET ORAL
Qty: 30 TABLET | Refills: 1 | Status: SHIPPED
Start: 2022-08-09 | End: 2022-09-21 | Stop reason: SDUPTHER

## 2022-08-10 ENCOUNTER — TELEPHONE (OUTPATIENT)
Dept: ORTHOPEDIC SURGERY | Age: 62
End: 2022-08-10

## 2022-08-18 ENCOUNTER — HOSPITAL ENCOUNTER (OUTPATIENT)
Age: 62
Discharge: HOME | End: 2022-08-18
Attending: ORTHOPAEDIC SURGERY
Payer: MEDICAID

## 2022-08-18 DIAGNOSIS — M17.12 PRIMARY OSTEOARTHRITIS OF LEFT KNEE: ICD-10-CM

## 2022-08-18 DIAGNOSIS — S83.8X2A MENISCAL INJURY, LEFT, INITIAL ENCOUNTER: ICD-10-CM

## 2022-08-18 PROCEDURE — 73721 MRI JNT OF LWR EXTRE W/O DYE: CPT

## 2022-09-08 ENCOUNTER — OFFICE VISIT (OUTPATIENT)
Dept: ORTHOPEDIC SURGERY | Age: 62
End: 2022-09-08
Payer: MEDICAID

## 2022-09-08 VITALS — BODY MASS INDEX: 36.37 KG/M2 | HEIGHT: 64 IN | WEIGHT: 213 LBS | TEMPERATURE: 97.7 F

## 2022-09-08 DIAGNOSIS — M17.11 PRIMARY OSTEOARTHRITIS OF RIGHT KNEE: ICD-10-CM

## 2022-09-08 DIAGNOSIS — S83.242A ACUTE MEDIAL MENISCUS TEAR OF LEFT KNEE, INITIAL ENCOUNTER: ICD-10-CM

## 2022-09-08 DIAGNOSIS — M17.12 PRIMARY OSTEOARTHRITIS OF LEFT KNEE: Primary | ICD-10-CM

## 2022-09-08 PROCEDURE — 99213 OFFICE O/P EST LOW 20 MIN: CPT | Performed by: PHYSICIAN ASSISTANT
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
## 2022-09-08 NOTE — PATIENT INSTRUCTIONS
Dr. Andria Levy Knee Arthroscopy Surgery    What is the surgery? This is an outpatient procedure at either [facility] or [address] will be completely asleep for procedure. Dr. Andria Levy will make 2 small incisions in your knee. He will take a tour of your knee with the camera and then address the meniscal tear(s). We will be able to evaluate if any arthritis in your knee but this surgery is not to treat your arthritis. Total surgery takes about 25-30 mins     What can you expect after surgery? You will have a bulky dressing on your knee that you can remove 2 days after surgery. You will be able to shower 2 days after surgery but no soaking in a bath, hot tub, ocean or pool x 2 weeks to allow for full wound healing. No special brace is needed. You will be on crutches or a walker when you leave the hospital. You can place weight on your leg as tolerated starting immediately. You are usually on crutches or your walker for about 4-5 days. Even though you can place weight on your leg, we recommend no walking or standing longer than 10mins for the two weeks. The only therapy your will need during this 2 weeks is gently bending and extending your leg. Please start this immediately. We will gradually increase your activities after that point. Dr. Andria Levy will start physical therapy for you when you return for your 2 week post op apt  It will take your 6-8 weeks to fully recover from your surgery. When can I return to work? Most patients return to desk work only after 1-2 weeks. We recommend no prolonged walking or standing, climbing, kneeling, or crawling x 6-8 weeks. Not all knee arthroscopies are the same. The specifics of your individual case will be discussed at length with you by Dr. Andria Levy and his Physician Assistant. Jaz Elias  Surgical Coordinator  [address], [facility] [email or web address]  P: [phone]  F: [phone]

## 2022-09-21 ENCOUNTER — OFFICE VISIT (OUTPATIENT)
Dept: ORTHOPEDIC SURGERY | Age: 62
End: 2022-09-21
Payer: MEDICAID

## 2022-09-21 VITALS
SYSTOLIC BLOOD PRESSURE: 131 MMHG | BODY MASS INDEX: 36.88 KG/M2 | HEIGHT: 64 IN | OXYGEN SATURATION: 99 % | DIASTOLIC BLOOD PRESSURE: 59 MMHG | TEMPERATURE: 97 F | WEIGHT: 216 LBS | HEART RATE: 51 BPM

## 2022-09-21 DIAGNOSIS — G62.9 PERIPHERAL POLYNEUROPATHY: ICD-10-CM

## 2022-09-21 DIAGNOSIS — M62.838 MUSCLE SPASM: ICD-10-CM

## 2022-09-21 DIAGNOSIS — M54.50 LUMBAR PAIN: ICD-10-CM

## 2022-09-21 DIAGNOSIS — R20.2 BILATERAL LEG PARESTHESIA: ICD-10-CM

## 2022-09-21 PROCEDURE — 99214 OFFICE O/P EST MOD 30 MIN: CPT | Performed by: NURSE PRACTITIONER

## 2022-09-21 RX ORDER — METHOCARBAMOL 750 MG/1
750 TABLET, FILM COATED ORAL
Qty: 30 TABLET | Refills: 5 | Status: SHIPPED | OUTPATIENT
Start: 2022-09-21

## 2022-09-21 RX ORDER — PREGABALIN 300 MG/1
300 CAPSULE ORAL DAILY
Qty: 30 CAPSULE | Refills: 5 | Status: SHIPPED | OUTPATIENT
Start: 2022-09-21

## 2022-09-21 RX ORDER — DULOXETIN HYDROCHLORIDE 20 MG/1
20 CAPSULE, DELAYED RELEASE ORAL DAILY
Qty: 30 CAPSULE | Refills: 5 | Status: SHIPPED | OUTPATIENT
Start: 2022-09-21

## 2022-09-21 NOTE — PROGRESS NOTES
Chief complaint   Chief Complaint   Patient presents with    Back Pain     Lower           History of Present Illness:  Suzanna Castro is a  57 y.o.  female who comes in today for follow-up of her chronic low back pain with bilateral lower extremity pain down to her feet. She had a EMG done in March that showed bilateral sensorimotor peripheral neuropathy. Last visit we added Cymbalta 20 mg onto her Lyrica 300 mg twice a day. She states that the addition of the Cymbalta helped a lot and she would like to continue that. She states she does not always take the Lyrica 300 mg twice a day but mostly takes it once a day. And seems to be doing okay on that. She is diabetic and her blood sugars are controlled. She also takes Robaxin-750 milligrams at night which she states does help with the tightness in her back. She has failed gabapentin in the past.  We also referred her to orthopedics and she has been seeing Dr. Kylah Ovalle for her left knee. She states she has a meniscal tear and they are wanting her to do surgery. She is still thinking about that. She denies fever bowel bladder dysfunction. Physical Exam: The patient is a 57year-old female well-developed well-nourished who is alert and oriented with a normal mood and affect. She has a full weightbearing nonantalgic gait. She did not use any assist device. She has 4 out of 5 strength bilateral lower extremities. Negative straight leg raise. She has some pain with hyperextension of her spine. Assessment and Plan: This is a patient who has back pain with central and lateral peripheral neuropathy that gives her pain relief her legs. I will decrease her Lyrica to 300 mg once a day. I gave her prescription for that I have also refilled her Robaxin and Cymbalta. We will see her back in 6-month sooner if needed.     Medications:  Current Outpatient Medications   Medication Sig Dispense Refill    DULoxetine (CYMBALTA) 20 mg capsule Take 1 Capsule by mouth daily. 30 Capsule 5    methocarbamoL (ROBAXIN) 750 mg tablet Take 1 Tablet by mouth nightly as needed for Muscle Spasm(s). 30 Tablet 5    pregabalin (LYRICA) 300 mg capsule Take 1 Capsule by mouth daily. Max Daily Amount: 300 mg. 30 Capsule 5    meloxicam (Mobic) 15 mg tablet Take 1 Tablet by mouth daily (with breakfast). 30 Tablet 1    diclofenac (VOLTAREN) 1 % gel Apply 2 g to affected area daily as needed. zinc 50 mg tab tablet Take  by mouth daily. cetirizine (ZYRTEC) 10 mg tablet Take 10 mg by mouth daily as needed. levothyroxine (SYNTHROID) 75 mcg tablet Take  by mouth Daily (before breakfast). atorvastatin (LIPITOR) 40 mg tablet Take  by mouth daily. aspirin delayed-release 81 mg tablet Take 81 mg by mouth daily. hydroCHLOROthiazide (HYDRODIURIL) 12.5 mg tablet Take 12.5 mg by mouth daily. metFORMIN (GLUCOPHAGE) 1,000 mg tablet Take 1 Tab by mouth two (2) times daily (with meals). (Patient taking differently: Take 1,000 mg by mouth daily.) 60 Tab 0    fluticasone propionate (FLOVENT DISKUS) 100 mcg/actuation dsdv inhaler Take  by inhalation as needed.              Review of systems:    Past Medical History:   Diagnosis Date    Arthritis     Asthma     Cyst of left kidney     Diabetes ([address]     Diverticulosis     Gross hematuria     Heart murmur     Hyperlipidemia     Hypertension     Hypothyroid     Hypothyroidism     Incomplete bladder emptying     Incontinence     Kidney stone     Ovarian cyst, right     Renal cyst     Sinusitis     Type 2 diabetes mellitus ([address]     UTI (urinary tract infection)      Past Surgical History:   Procedure Laterality Date    COLONOSCOPY N/A 8/12/2020    DIAGNOSTIC COLONOSCOPY performed by Jaguar Finley MD at [facility] ENDOSCOPY    HX HYSTERECTOMY      HX OVARIAN CYST REMOVAL Right     HX TONSILLECTOMY       Social History     Socioeconomic History    Marital status:      Spouse name: Not on file    Number of children: Not on file    Years of education: Not on file    Highest education level: Not on file   Occupational History    Not on file   Tobacco Use    Smoking status: Never    Smokeless tobacco: Never   Substance and Sexual Activity    Alcohol use: Never    Drug use: Never    Sexual activity: Never   Other Topics Concern    Not on file   Social History Narrative    ** Merged History Encounter **          Social Determinants of Health     Financial Resource Strain: Not on file   Food Insecurity: Not on file   Transportation Needs: Not on file   Physical Activity: Not on file   Stress: Not on file   Social Connections: Not on file   Intimate Partner Violence: Not on file   Housing Stability: Not on file     Family History   Problem Relation Age of Onset    Heart Disease Mother     Stroke Father     Other Other         Epilepsy       Physical Exam:  Visit Vitals  BP (!) 131/59 (BP 1 Location: Right lower arm, BP Patient Position: Sitting, BP Cuff Size: Adult)   Pulse (!) 51 Comment: pt asymptomatic, NP aware   Temp 97 °F (36.1 °C) (Temporal)   Ht 5' 4\" (1.626 m)   Wt 216 lb (98 kg)   SpO2 99% Comment: RA   BMI 37.08 kg/m²       Pain Scale: 5/10       has been . reviewed and is appropriate          Diagnoses and all orders for this visit:    1. Peripheral polyneuropathy  -     DULoxetine (CYMBALTA) 20 mg capsule; Take 1 Capsule by mouth daily. -     pregabalin (LYRICA) 300 mg capsule; Take 1 Capsule by mouth daily. Max Daily Amount: 300 mg.    2. Lumbar pain  -     DULoxetine (CYMBALTA) 20 mg capsule; Take 1 Capsule by mouth daily. -     pregabalin (LYRICA) 300 mg capsule; Take 1 Capsule by mouth daily. Max Daily Amount: 300 mg.    3. Muscle spasm  -     methocarbamoL (ROBAXIN) 750 mg tablet; Take 1 Tablet by mouth nightly as needed for Muscle Spasm(s). 4. Bilateral leg paresthesia  -     DULoxetine (CYMBALTA) 20 mg capsule; Take 1 Capsule by mouth daily. -     pregabalin (LYRICA) 300 mg capsule;  Take 1 Capsule by mouth daily. Max Daily Amount: 300 mg. Follow-up and Dispositions    Return in about 6 months (around 3/21/2023) for with TELLY Mcmillan. We have informed Vera Sanchez to notify us for immediate appointment if she has any worsening neurogical symptoms or if an emergency situation presents, then call [phone]    Please note that this dictation was completed with iQiyi, the computer voice recognition software. Quite often unanticipated grammatical, syntax, homophones, and other interpretive errors are inadvertently transcribed by the computer software. Please disregard these errors. Please excuse any errors that have escaped final proofreading.

## 2022-09-21 NOTE — PROGRESS NOTES
Heladio Bradley presents today for   Chief Complaint   Patient presents with    Back Pain     Lower         Is someone accompanying this pt? no    Is the patient using any DME equipment during OV? no    Depression Screening:  3 most recent PHQ Screens 2/11/2021   PHQ Not Done -   Little interest or pleasure in doing things Not at all   Feeling down, depressed, irritable, or hopeless Not at all   Total Score PHQ 2 0   Trouble falling or staying asleep, or sleeping too much -   Feeling tired or having little energy -   Poor appetite, weight loss, or overeating -   Feeling bad about yourself - or that you are a failure or have let yourself or your family down -   Trouble concentrating on things such as school, work, reading, or watching TV -   Moving or speaking so slowly that other people could have noticed; or the opposite being so fidgety that others notice -   Thoughts of being better off dead, or hurting yourself in some way -   PHQ 9 Score -   How difficult have these problems made it for you to do your work, take care of your home and get along with others -       Learning Assessment:  Learning Assessment 3/2/2022   PRIMARY LEARNER Patient   PRIMARY LANGUAGE ENGLISH   LEARNER PREFERENCE PRIMARY DEMONSTRATION   ANSWERED BY patient   RELATIONSHIP SELF       Abuse Screening:  Abuse Screening Questionnaire 3/2/2022   Do you ever feel afraid of your partner? N   Are you in a relationship with someone who physically or mentally threatens you? N   Is it safe for you to go home? Y       Fall Risk  Fall Risk Assessment, last 12 mths 3/2/2022   Able to walk? Yes   Fall in past 12 months? 0   Do you feel unsteady? 0   Are you worried about falling 0         Coordination of Care:  1. Have you been to the ER, urgent care clinic since your last visit? no  Hospitalized since your last visit? no    2.  Have you seen or consulted any other health care providers outside of the [address] [place] [address] since your last visit? Yes, ortho Include any pap smears or colon screening.  no
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
## 2023-03-24 ENCOUNTER — OFFICE VISIT (OUTPATIENT)
Age: 63
End: 2023-03-24
Payer: COMMERCIAL

## 2023-03-24 VITALS
RESPIRATION RATE: 18 BRPM | TEMPERATURE: 97.8 F | HEIGHT: 64 IN | OXYGEN SATURATION: 98 % | WEIGHT: 222.4 LBS | HEART RATE: 60 BPM | BODY MASS INDEX: 37.97 KG/M2 | DIASTOLIC BLOOD PRESSURE: 84 MMHG | SYSTOLIC BLOOD PRESSURE: 134 MMHG

## 2023-03-24 DIAGNOSIS — G89.29 CHRONIC BILATERAL LOW BACK PAIN WITH BILATERAL SCIATICA: ICD-10-CM

## 2023-03-24 DIAGNOSIS — G62.9 POLYNEUROPATHY, UNSPECIFIED: Primary | ICD-10-CM

## 2023-03-24 DIAGNOSIS — M54.41 CHRONIC BILATERAL LOW BACK PAIN WITH BILATERAL SCIATICA: ICD-10-CM

## 2023-03-24 DIAGNOSIS — M54.42 CHRONIC BILATERAL LOW BACK PAIN WITH BILATERAL SCIATICA: ICD-10-CM

## 2023-03-24 PROCEDURE — 3075F SYST BP GE 130 - 139MM HG: CPT | Performed by: NURSE PRACTITIONER

## 2023-03-24 PROCEDURE — 3079F DIAST BP 80-89 MM HG: CPT | Performed by: NURSE PRACTITIONER

## 2023-03-24 PROCEDURE — 99214 OFFICE O/P EST MOD 30 MIN: CPT | Performed by: NURSE PRACTITIONER

## 2023-03-24 RX ORDER — DULAGLUTIDE 1.5 MG/.5ML
INJECTION, SOLUTION SUBCUTANEOUS
COMMUNITY
Start: 2023-03-22

## 2023-03-24 RX ORDER — PREGABALIN 300 MG/1
300 CAPSULE ORAL DAILY
Qty: 30 CAPSULE | Refills: 5 | Status: SHIPPED | OUTPATIENT
Start: 2023-03-24 | End: 2023-09-20

## 2023-03-24 RX ORDER — DULOXETIN HYDROCHLORIDE 20 MG/1
20 CAPSULE, DELAYED RELEASE ORAL DAILY
Qty: 30 CAPSULE | Refills: 5 | Status: SHIPPED | OUTPATIENT
Start: 2023-03-24

## 2023-03-24 RX ORDER — MELOXICAM 15 MG/1
15 TABLET ORAL
Qty: 30 TABLET | Refills: 2 | Status: SHIPPED | OUTPATIENT
Start: 2023-03-24

## 2023-03-24 RX ORDER — FAMOTIDINE 20 MG/1
TABLET, FILM COATED ORAL
COMMUNITY
Start: 2023-03-07

## 2023-03-24 RX ORDER — AZELASTINE HYDROCHLORIDE 137 UG/1
SPRAY, METERED NASAL
COMMUNITY
Start: 2023-03-08

## 2023-03-24 NOTE — PROGRESS NOTES
Odalys Nur presents today for   Chief Complaint   Patient presents with    Pain    Back Pain    Back Problem       Is someone accompanying this pt? no    Is the patient using any DME equipment during OV? no    Depression Screening:  No flowsheet data found. Learning Assessment:  No flowsheet data found. Abuse Screening:  No flowsheet data found. Fall Risk  No flowsheet data found. OPIOID RISK TOOL  No flowsheet data found. Coordination of Care:  1. Have you been to the ER, urgent care clinic since your last visit? no  Hospitalized since your last visit? no    2. Have you seen or consulted any other health care providers outside of the [address] [place] [address] since your last visit? no Include any pap smears or colon screening.  no
mouth daily for 180 days. Max Daily Amount: 300 mg    Chronic bilateral low back pain with bilateral sciatica  -     DULoxetine (CYMBALTA) 20 MG extended release capsule; Take 1 capsule by mouth daily  -     pregabalin (LYRICA) 300 MG capsule; Take 1 capsule by mouth daily for 180 days. Max Daily Amount: 300 mg  -     meloxicam (MOBIC) 15 MG tablet; Take 1 tablet by mouth daily (with breakfast)        Return in about 6 months (around 9/24/2023) for fu with NP [place].  has been . reviewed and is appropriate    Medications:  Current Outpatient Medications   Medication Sig Dispense Refill    Azelastine HCl 137 MCG/SPRAY SOLN USE 2 SPRAY(S) IN EACH NOSTRIL ONCE DAILY IN THE MORNING CAN ALSO USE TWO SPRAYS IN EACH NOSTRIL AT LUNCHTIME IF NEEDED      TRULICITY 1.5 DF/7.0DG SC injection INJECT 1.5MG BENEATH THE SKIN WEEKLY (AFTER COMPLETING ONE MONTH OF 0.75MG WEEKLY DOSING)      famotidine (PEPCID) 20 MG tablet TAKE 1 TABLET BY MOUTH EVERY 12 HOURS AS NEEDED FOR REFLUX      metFORMIN (GLUCOPHAGE) 500 MG tablet TAKE 1 TABLET BY MOUTH TWICE DAILY WITH A MEAL      DULoxetine (CYMBALTA) 20 MG extended release capsule Take 1 capsule by mouth daily 30 capsule 5    pregabalin (LYRICA) 300 MG capsule Take 1 capsule by mouth daily for 180 days.  Max Daily Amount: 300 mg 30 capsule 5    meloxicam (MOBIC) 15 MG tablet Take 1 tablet by mouth daily (with breakfast) 30 tablet 2    aspirin 81 MG EC tablet Take 81 mg by mouth daily      atorvastatin (LIPITOR) 40 MG tablet Take by mouth daily      cetirizine (ZYRTEC) 10 MG tablet Take 10 mg by mouth daily as needed      diclofenac sodium (VOLTAREN) 1 % GEL Apply 2 g topically daily as needed      Fluticasone Propionate, Inhal, (FLOVENT DISKUS) 100 MCG/ACT AEPB Inhale into the lungs as needed      hydroCHLOROthiazide (HYDRODIURIL) 12.5 MG tablet Take 12.5 mg by mouth daily      levothyroxine (SYNTHROID) 75 MCG tablet Take by mouth every morning (before breakfast)      methocarbamol

## 2023-04-13 ENCOUNTER — COMPREHENSIVE EXAM (OUTPATIENT)
Dept: URBAN - METROPOLITAN AREA CLINIC 1 | Facility: CLINIC | Age: 63
End: 2023-04-13

## 2023-04-13 DIAGNOSIS — D23.122: ICD-10-CM

## 2023-04-13 DIAGNOSIS — E11.9: ICD-10-CM

## 2023-04-13 DIAGNOSIS — D23.112: ICD-10-CM

## 2023-04-13 DIAGNOSIS — H25.813: ICD-10-CM

## 2023-04-13 DIAGNOSIS — H11.153: ICD-10-CM

## 2023-04-13 PROCEDURE — 92015 DETERMINE REFRACTIVE STATE: CPT

## 2023-04-13 PROCEDURE — 92014 COMPRE OPH EXAM EST PT 1/>: CPT

## 2023-04-13 ASSESSMENT — VISUAL ACUITY
OD_BAT: 20/30
OS_SC: 20/20-1
OD_SC: 20/25
OS_SC: J5
OS_BAT: 20/30
OD_SC: J7

## 2023-04-13 ASSESSMENT — TONOMETRY
OD_IOP_MMHG: 19
OS_IOP_MMHG: 19

## 2023-06-08 ENCOUNTER — OFFICE VISIT (OUTPATIENT)
Age: 63
End: 2023-06-08
Payer: COMMERCIAL

## 2023-06-08 VITALS — WEIGHT: 224 LBS | BODY MASS INDEX: 38.24 KG/M2 | HEIGHT: 64 IN

## 2023-06-08 DIAGNOSIS — S83.232D COMPLEX TEAR OF MEDIAL MENISCUS OF LEFT KNEE AS CURRENT INJURY, SUBSEQUENT ENCOUNTER: Primary | ICD-10-CM

## 2023-06-08 DIAGNOSIS — M17.11 UNILATERAL PRIMARY OSTEOARTHRITIS, RIGHT KNEE: ICD-10-CM

## 2023-06-08 DIAGNOSIS — M17.12 UNILATERAL PRIMARY OSTEOARTHRITIS, LEFT KNEE: ICD-10-CM

## 2023-06-08 PROCEDURE — 20611 DRAIN/INJ JOINT/BURSA W/US: CPT | Performed by: PHYSICIAN ASSISTANT

## 2023-06-08 PROCEDURE — 99213 OFFICE O/P EST LOW 20 MIN: CPT | Performed by: PHYSICIAN ASSISTANT

## 2023-06-08 RX ORDER — TRIAMCINOLONE ACETONIDE 40 MG/ML
40 INJECTION, SUSPENSION INTRA-ARTICULAR; INTRAMUSCULAR ONCE
Status: COMPLETED | OUTPATIENT
Start: 2023-06-08 | End: 2023-06-08

## 2023-06-08 RX ADMIN — TRIAMCINOLONE ACETONIDE 40 MG: 40 INJECTION, SUSPENSION INTRA-ARTICULAR; INTRAMUSCULAR at 11:25

## 2023-06-08 NOTE — PATIENT INSTRUCTIONS
Dr. Kalyani Nguyen Knee Arthroscopy Surgery    What is the surgery? This is an outpatient procedure at either [facility] or [address] will be completely asleep for procedure. Dr. Kalyani Nguyen will make 2 small incisions in your knee. He will take a tour of your knee with the camera and then address the meniscal tear(s). We will be able to evaluate if any arthritis in your knee but this surgery is not to treat your arthritis. Total surgery takes about 25-30 mins     What can you expect after surgery? You will have a bulky dressing on your knee that you can remove 2 days after surgery. You will be able to shower 2 days after surgery but no soaking in a bath, hot tub, ocean or pool x 2 weeks to allow for full wound healing. No special brace is needed. You will be on crutches or a walker when you leave the hospital. You can place weight on your leg as tolerated starting immediately. You are usually on crutches or your walker for about 4-5 days. Even though you can place weight on your leg, we recommend no walking or standing longer than 10mins for the first week. We will gradually increase your activities after that point. Dr. Kalyani Nguyen will start physical therapy for you when you return for your 1 week post op apt  It will take your 6-8 weeks to fully recover from your surgery. When can I return to work? Most patients return to desk work only after 1-2 weeks. We recommend no prolonged walking or standing, climbing, kneeling, or crawling x 6-8 weeks. Not all knee arthroscopies are the same. The specifics of your individual case will be discussed at length with you by Dr. Kalyani Nguyen and his Physician Assistant. Arlette Benito  Surgical Coordinator  [address] [place] [address]. [place].  [address], [email or web address]  P: [phone]  F: [phone]

## 2023-11-08 ENCOUNTER — OFFICE VISIT (OUTPATIENT)
Age: 63
End: 2023-11-08

## 2023-11-08 VITALS — HEIGHT: 64 IN | WEIGHT: 224 LBS | BODY MASS INDEX: 38.24 KG/M2

## 2023-11-08 DIAGNOSIS — S83.232D COMPLEX TEAR OF MEDIAL MENISCUS OF LEFT KNEE AS CURRENT INJURY, SUBSEQUENT ENCOUNTER: ICD-10-CM

## 2023-11-08 DIAGNOSIS — M17.12 UNILATERAL PRIMARY OSTEOARTHRITIS, LEFT KNEE: Primary | ICD-10-CM

## 2023-11-08 PROCEDURE — 1123F ACP DISCUSS/DSCN MKR DOCD: CPT | Performed by: PHYSICIAN ASSISTANT

## 2023-11-08 PROCEDURE — 99214 OFFICE O/P EST MOD 30 MIN: CPT | Performed by: PHYSICIAN ASSISTANT

## 2023-11-08 PROCEDURE — 20611 DRAIN/INJ JOINT/BURSA W/US: CPT | Performed by: PHYSICIAN ASSISTANT

## 2023-11-08 RX ORDER — TRIAMCINOLONE ACETONIDE 40 MG/ML
40 INJECTION, SUSPENSION INTRA-ARTICULAR; INTRAMUSCULAR ONCE
Status: COMPLETED | OUTPATIENT
Start: 2023-11-08 | End: 2023-11-08

## 2023-11-08 RX ADMIN — TRIAMCINOLONE ACETONIDE 40 MG: 40 INJECTION, SUSPENSION INTRA-ARTICULAR; INTRAMUSCULAR at 15:22

## 2024-01-05 DIAGNOSIS — Z01.818 PREOP EXAMINATION: Primary | ICD-10-CM

## 2024-01-11 ENCOUNTER — HOSPITAL ENCOUNTER (OUTPATIENT)
Facility: HOSPITAL | Age: 64
Discharge: HOME | End: 2024-01-11
Payer: MEDICARE

## 2024-01-11 DIAGNOSIS — Z01.818 PREOP EXAMINATION: ICD-10-CM

## 2024-01-11 LAB
ANION GAP SERPL CALC-SCNC: 2 MMOL/L (ref 3–18)
BUN SERPL-MCNC: 16 MG/DL (ref 7–18)
BUN/CREAT SERPL: 18 (ref 12–20)
CALCIUM SERPL-MCNC: 9.2 MG/DL (ref 8.5–10.1)
CHLORIDE SERPL-SCNC: 105 MMOL/L (ref 100–111)
CO2 SERPL-SCNC: 33 MMOL/L (ref 21–32)
CREAT SERPL-MCNC: 0.89 MG/DL (ref 0.6–1.3)
EKG ATRIAL RATE: 60 BPM
EKG DIAGNOSIS: NORMAL
EKG P AXIS: 52 DEGREES
EKG P-R INTERVAL: 160 MS
EKG Q-T INTERVAL: 420 MS
EKG QRS DURATION: 92 MS
EKG QTC CALCULATION (BAZETT): 420 MS
EKG R AXIS: 5 DEGREES
EKG T AXIS: -59 DEGREES
EKG VENTRICULAR RATE: 60 BPM
ERYTHROCYTE [DISTWIDTH] IN BLOOD BY AUTOMATED COUNT: 13.5 % (ref 11.6–14.5)
GLUCOSE SERPL-MCNC: 117 MG/DL (ref 74–99)
HCT VFR BLD AUTO: 43.1 % (ref 35–45)
HGB BLD-MCNC: 13.6 G/DL (ref 12–16)
MCH RBC QN AUTO: 29.9 PG (ref 24–34)
MCHC RBC AUTO-ENTMCNC: 31.6 G/DL (ref 31–37)
MCV RBC AUTO: 94.7 FL (ref 78–100)
NRBC # BLD: 0 K/UL (ref 0–0.01)
NRBC BLD-RTO: 0 PER 100 WBC
PLATELET # BLD AUTO: 283 K/UL (ref 135–420)
PMV BLD AUTO: 9.3 FL (ref 9.2–11.8)
POTASSIUM SERPL-SCNC: 4.2 MMOL/L (ref 3.5–5.5)
RBC # BLD AUTO: 4.55 M/UL (ref 4.2–5.3)
SODIUM SERPL-SCNC: 140 MMOL/L (ref 136–145)
WBC # BLD AUTO: 4 K/UL (ref 4.6–13.2)

## 2024-01-11 PROCEDURE — 93010 ELECTROCARDIOGRAM REPORT: CPT | Performed by: INTERNAL MEDICINE

## 2024-01-11 PROCEDURE — 93005 ELECTROCARDIOGRAM TRACING: CPT

## 2024-01-11 PROCEDURE — 85027 COMPLETE CBC AUTOMATED: CPT

## 2024-01-11 PROCEDURE — 36415 COLL VENOUS BLD VENIPUNCTURE: CPT

## 2024-01-11 PROCEDURE — 80048 BASIC METABOLIC PNL TOTAL CA: CPT

## 2024-02-13 ENCOUNTER — OFFICE VISIT (OUTPATIENT)
Age: 64
End: 2024-02-13

## 2024-02-13 VITALS
WEIGHT: 215 LBS | BODY MASS INDEX: 36.7 KG/M2 | SYSTOLIC BLOOD PRESSURE: 140 MMHG | DIASTOLIC BLOOD PRESSURE: 92 MMHG | HEART RATE: 78 BPM | HEIGHT: 64 IN

## 2024-02-13 DIAGNOSIS — M17.12 UNILATERAL PRIMARY OSTEOARTHRITIS, LEFT KNEE: ICD-10-CM

## 2024-02-13 DIAGNOSIS — S83.232D COMPLEX TEAR OF MEDIAL MENISCUS OF LEFT KNEE AS CURRENT INJURY, SUBSEQUENT ENCOUNTER: Primary | ICD-10-CM

## 2024-02-13 RX ORDER — PROMETHAZINE HYDROCHLORIDE 25 MG/1
25 TABLET ORAL 4 TIMES DAILY PRN
Qty: 20 TABLET | Refills: 0 | Status: SHIPPED | OUTPATIENT
Start: 2024-02-13 | End: 2024-02-20

## 2024-02-13 RX ORDER — HYDROCODONE BITARTRATE AND ACETAMINOPHEN 7.5; 325 MG/1; MG/1
1 TABLET ORAL EVERY 4 HOURS PRN
Qty: 40 TABLET | Refills: 0 | Status: SHIPPED | OUTPATIENT
Start: 2024-02-13 | End: 2024-02-20

## 2024-02-13 RX ORDER — ACETAMINOPHEN 325 MG/1
1000 TABLET ORAL ONCE
OUTPATIENT
Start: 2024-02-13 | End: 2024-02-13
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
## 2024-02-13 NOTE — PATIENT INSTRUCTIONS
Dr. Vega Knee Arthroscopy Surgery    What is the surgery?  This is an outpatient procedure at either [facility] or [facility]  You will be completely asleep for procedure. Dr. Vega will make 2 small incisions in your knee. He will take a tour of your knee with the camera and then address the meniscal tear(s). We will be able to evaluate if any arthritis in your knee but this surgery is not to treat your arthritis.  Total surgery takes about 25-30 mins     What can you expect after surgery?  You will have a bulky dressing on your knee that you can remove 2 days after surgery. You will be able to shower 2 days after surgery but no soaking in a bath, hot tub, ocean or pool x 2 weeks to allow for full wound healing. No special brace is needed.  You will be on crutches or a walker when you leave the hospital. You can place weight on your leg as tolerated starting immediately. You are usually on crutches or your walker for about 4-5 days.   Even though you can place weight on your leg, we recommend no walking or standing longer than 10mins for the first week. We will gradually increase your activities after that point.    Dr. Vega will start physical therapy for you when you return for your 1 week post op apt  It will take your 6-8 weeks to fully recover from your surgery.    When can I return to work?  Most patients return to desk work only after 1-2 weeks. We recommend no prolonged walking or standing, climbing, kneeling, or crawling x 6-8 weeks.     Not all knee arthroscopies are the same. The specifics of your individual case will be discussed at length with you by Dr. Vega and his Physician Assistant.        Amanda Sainz  Surgical Coordinator  [address] [place]. [address] [place] [address]  [email or web address]  P: [phone]  F: [phone]

## 2024-02-14 RX ORDER — DULAGLUTIDE 3 MG/.5ML
3 INJECTION, SOLUTION SUBCUTANEOUS
COMMUNITY
Start: 2024-02-01

## 2024-02-14 RX ORDER — ALBUTEROL SULFATE 90 UG/1
2 AEROSOL, METERED RESPIRATORY (INHALATION) EVERY 6 HOURS PRN
COMMUNITY

## 2024-02-14 NOTE — DISCHARGE INSTRUCTIONS
Dr. Vega’s Knee Arthroscopy  Postoperative Information    You will be given a prescription for pain medication.  It may be taken every 4-6 hours as needed for the first 4-5 days.  You may elevate your leg and place an ice pack on top of the dressing in  order to prevent swelling.    A soft bandage was placed on your knee to soak up blood and fluid. You may take the bandage off one day after surgery.   Band-Aids should be used for the first 4-5 days over each incision.     There are 2 small incisions in your knee that may be sore and develop bruising over the next several days. This should resolve over the next few weeks. No special care will be needed. You should expect swelling in the area. You may elevate your leg and apply an icepack on top of the dressing to help minimize the swelling. Deep massage to the lower leg may also be utilized.     It is safe to take a shower one day after surgery.    You may begin bearing weight on your leg with the use of crutches for the first 4-5 days. Apply as much weight as tolerated so that at the end of 4-5 days, you can walk without crutches. Avoid prolonged walking or standing during the first few weeks after surgery.  During your first two weeks please work on gentle range of motion of your knee like you were instructed in the office. Simply bend and extend the leg. This is the only therapy you will need x 2 weeks.      Even though your incisions are small, there has been an operation inside and around the knee joint. Complete healing may take several months.    If you have a high temperature, unexpected pain, redness or swelling, or any drainage around your knee area, please call my office immediately.     Please make an appointment to return to my office in two weeks.    Dr. Vega’s office number [phone]      DISCHARGE SUMMARY from Nurse    PATIENT INSTRUCTIONS:    After general anesthesia or intravenous sedation, for 24 hours or while taking prescription

## 2024-02-22 ENCOUNTER — ANESTHESIA EVENT (OUTPATIENT)
Facility: HOSPITAL | Age: 64
End: 2024-02-22
Payer: MEDICARE

## 2024-02-23 ENCOUNTER — HOSPITAL ENCOUNTER (OUTPATIENT)
Facility: HOSPITAL | Age: 64
Setting detail: OUTPATIENT SURGERY
Discharge: HOME | End: 2024-02-23
Attending: ORTHOPAEDIC SURGERY | Admitting: ORTHOPAEDIC SURGERY
Payer: MEDICARE

## 2024-02-23 ENCOUNTER — ANESTHESIA (OUTPATIENT)
Facility: HOSPITAL | Age: 64
End: 2024-02-23
Payer: MEDICARE

## 2024-02-23 VITALS
DIASTOLIC BLOOD PRESSURE: 71 MMHG | SYSTOLIC BLOOD PRESSURE: 124 MMHG | TEMPERATURE: 97.7 F | HEART RATE: 58 BPM | RESPIRATION RATE: 18 BRPM | HEIGHT: 64 IN | BODY MASS INDEX: 37.08 KG/M2 | OXYGEN SATURATION: 100 % | WEIGHT: 217.2 LBS

## 2024-02-23 LAB
GLUCOSE BLD STRIP.AUTO-MCNC: 120 MG/DL (ref 70–110)
GLUCOSE BLD STRIP.AUTO-MCNC: 135 MG/DL (ref 70–110)

## 2024-02-23 PROCEDURE — 6360000002 HC RX W HCPCS: Performed by: NURSE ANESTHETIST, CERTIFIED REGISTERED

## 2024-02-23 PROCEDURE — 3700000000 HC ANESTHESIA ATTENDED CARE: Performed by: ORTHOPAEDIC SURGERY

## 2024-02-23 PROCEDURE — 6360000002 HC RX W HCPCS: Performed by: ORTHOPAEDIC SURGERY

## 2024-02-23 PROCEDURE — 7100000000 HC PACU RECOVERY - FIRST 15 MIN: Performed by: ORTHOPAEDIC SURGERY

## 2024-02-23 PROCEDURE — 6370000000 HC RX 637 (ALT 250 FOR IP): Performed by: PHYSICIAN ASSISTANT

## 2024-02-23 PROCEDURE — 82962 GLUCOSE BLOOD TEST: CPT

## 2024-02-23 PROCEDURE — 29880 ARTHRS KNE SRG MNISECTMY M&L: CPT | Performed by: ORTHOPAEDIC SURGERY

## 2024-02-23 PROCEDURE — 7100000001 HC PACU RECOVERY - ADDTL 15 MIN: Performed by: ORTHOPAEDIC SURGERY

## 2024-02-23 PROCEDURE — 2500000003 HC RX 250 WO HCPCS: Performed by: NURSE ANESTHETIST, CERTIFIED REGISTERED

## 2024-02-23 PROCEDURE — 2709999900 HC NON-CHARGEABLE SUPPLY: Performed by: ORTHOPAEDIC SURGERY

## 2024-02-23 PROCEDURE — 3700000001 HC ADD 15 MINUTES (ANESTHESIA): Performed by: ORTHOPAEDIC SURGERY

## 2024-02-23 PROCEDURE — 3600000002 HC SURGERY LEVEL 2 BASE: Performed by: ORTHOPAEDIC SURGERY

## 2024-02-23 PROCEDURE — 7100000010 HC PHASE II RECOVERY - FIRST 15 MIN: Performed by: ORTHOPAEDIC SURGERY

## 2024-02-23 PROCEDURE — 6370000000 HC RX 637 (ALT 250 FOR IP): Performed by: NURSE ANESTHETIST, CERTIFIED REGISTERED

## 2024-02-23 PROCEDURE — 7100000011 HC PHASE II RECOVERY - ADDTL 15 MIN: Performed by: ORTHOPAEDIC SURGERY

## 2024-02-23 PROCEDURE — 2580000003 HC RX 258: Performed by: NURSE ANESTHETIST, CERTIFIED REGISTERED

## 2024-02-23 PROCEDURE — 3600000012 HC SURGERY LEVEL 2 ADDTL 15MIN: Performed by: ORTHOPAEDIC SURGERY

## 2024-02-23 RX ORDER — ONDANSETRON 2 MG/ML
4 INJECTION INTRAMUSCULAR; INTRAVENOUS
Status: COMPLETED | OUTPATIENT
Start: 2024-02-23 | End: 2024-02-23

## 2024-02-23 RX ORDER — SCOLOPAMINE TRANSDERMAL SYSTEM 1 MG/1
1 PATCH, EXTENDED RELEASE TRANSDERMAL ONCE
Status: DISCONTINUED | OUTPATIENT
Start: 2024-02-23 | End: 2024-02-23 | Stop reason: HOSPADM

## 2024-02-23 RX ORDER — ACETAMINOPHEN 325 MG/1
1000 TABLET ORAL ONCE
Status: COMPLETED | OUTPATIENT
Start: 2024-02-23 | End: 2024-02-23

## 2024-02-23 RX ORDER — EPHEDRINE SULFATE/0.9% NACL/PF 25 MG/5 ML
SYRINGE (ML) INTRAVENOUS PRN
Status: DISCONTINUED | OUTPATIENT
Start: 2024-02-23 | End: 2024-02-23 | Stop reason: SDUPTHER

## 2024-02-23 RX ORDER — PROPOFOL 10 MG/ML
INJECTION, EMULSION INTRAVENOUS PRN
Status: DISCONTINUED | OUTPATIENT
Start: 2024-02-23 | End: 2024-02-23 | Stop reason: SDUPTHER

## 2024-02-23 RX ORDER — MIDAZOLAM HYDROCHLORIDE 1 MG/ML
INJECTION INTRAMUSCULAR; INTRAVENOUS PRN
Status: DISCONTINUED | OUTPATIENT
Start: 2024-02-23 | End: 2024-02-23 | Stop reason: SDUPTHER

## 2024-02-23 RX ORDER — DIPHENHYDRAMINE HYDROCHLORIDE 50 MG/ML
12.5 INJECTION INTRAMUSCULAR; INTRAVENOUS
Status: DISCONTINUED | OUTPATIENT
Start: 2024-02-23 | End: 2024-02-23 | Stop reason: HOSPADM

## 2024-02-23 RX ORDER — LIDOCAINE HYDROCHLORIDE 10 MG/ML
1 INJECTION, SOLUTION EPIDURAL; INFILTRATION; INTRACAUDAL; PERINEURAL
Status: DISCONTINUED | OUTPATIENT
Start: 2024-02-23 | End: 2024-02-23 | Stop reason: HOSPADM

## 2024-02-23 RX ORDER — APREPITANT 40 MG/1
40 CAPSULE ORAL ONCE
Status: COMPLETED | OUTPATIENT
Start: 2024-02-23 | End: 2024-02-23

## 2024-02-23 RX ORDER — LIDOCAINE HYDROCHLORIDE 20 MG/ML
INJECTION, SOLUTION EPIDURAL; INFILTRATION; INTRACAUDAL; PERINEURAL PRN
Status: DISCONTINUED | OUTPATIENT
Start: 2024-02-23 | End: 2024-02-23 | Stop reason: SDUPTHER

## 2024-02-23 RX ORDER — DEXTROSE MONOHYDRATE 100 MG/ML
INJECTION, SOLUTION INTRAVENOUS CONTINUOUS PRN
Status: DISCONTINUED | OUTPATIENT
Start: 2024-02-23 | End: 2024-02-23 | Stop reason: HOSPADM

## 2024-02-23 RX ORDER — SODIUM CHLORIDE, SODIUM LACTATE, POTASSIUM CHLORIDE, CALCIUM CHLORIDE 600; 310; 30; 20 MG/100ML; MG/100ML; MG/100ML; MG/100ML
INJECTION, SOLUTION INTRAVENOUS CONTINUOUS
Status: DISCONTINUED | OUTPATIENT
Start: 2024-02-23 | End: 2024-02-23 | Stop reason: HOSPADM

## 2024-02-23 RX ORDER — HYDROCODONE BITARTRATE AND ACETAMINOPHEN 5; 325 MG/1; MG/1
1 TABLET ORAL
Status: COMPLETED | OUTPATIENT
Start: 2024-02-23 | End: 2024-02-23

## 2024-02-23 RX ORDER — DEXAMETHASONE SODIUM PHOSPHATE 4 MG/ML
INJECTION, SOLUTION INTRA-ARTICULAR; INTRALESIONAL; INTRAMUSCULAR; INTRAVENOUS; SOFT TISSUE PRN
Status: DISCONTINUED | OUTPATIENT
Start: 2024-02-23 | End: 2024-02-23 | Stop reason: SDUPTHER

## 2024-02-23 RX ORDER — BUPIVACAINE HYDROCHLORIDE 5 MG/ML
INJECTION, SOLUTION EPIDURAL; INTRACAUDAL PRN
Status: DISCONTINUED | OUTPATIENT
Start: 2024-02-23 | End: 2024-02-23 | Stop reason: ALTCHOICE

## 2024-02-23 RX ORDER — FENTANYL CITRATE 50 UG/ML
25 INJECTION, SOLUTION INTRAMUSCULAR; INTRAVENOUS EVERY 5 MIN PRN
Status: DISCONTINUED | OUTPATIENT
Start: 2024-02-23 | End: 2024-02-23 | Stop reason: HOSPADM

## 2024-02-23 RX ORDER — KETOROLAC TROMETHAMINE 15 MG/ML
INJECTION, SOLUTION INTRAMUSCULAR; INTRAVENOUS PRN
Status: DISCONTINUED | OUTPATIENT
Start: 2024-02-23 | End: 2024-02-23 | Stop reason: SDUPTHER

## 2024-02-23 RX ORDER — INSULIN LISPRO 100 [IU]/ML
0-15 INJECTION, SOLUTION INTRAVENOUS; SUBCUTANEOUS ONCE
Status: DISCONTINUED | OUTPATIENT
Start: 2024-02-23 | End: 2024-02-23 | Stop reason: HOSPADM

## 2024-02-23 RX ORDER — FAMOTIDINE 20 MG/1
20 TABLET, FILM COATED ORAL ONCE
Status: COMPLETED | OUTPATIENT
Start: 2024-02-23 | End: 2024-02-23

## 2024-02-23 RX ORDER — FENTANYL CITRATE 50 UG/ML
INJECTION, SOLUTION INTRAMUSCULAR; INTRAVENOUS PRN
Status: DISCONTINUED | OUTPATIENT
Start: 2024-02-23 | End: 2024-02-23 | Stop reason: SDUPTHER

## 2024-02-23 RX ORDER — CEFAZOLIN SODIUM 1 G/3ML
INJECTION, POWDER, FOR SOLUTION INTRAMUSCULAR; INTRAVENOUS PRN
Status: DISCONTINUED | OUTPATIENT
Start: 2024-02-23 | End: 2024-02-23 | Stop reason: SDUPTHER

## 2024-02-23 RX ORDER — ONDANSETRON 2 MG/ML
INJECTION INTRAMUSCULAR; INTRAVENOUS PRN
Status: DISCONTINUED | OUTPATIENT
Start: 2024-02-23 | End: 2024-02-23 | Stop reason: SDUPTHER

## 2024-02-23 RX ORDER — INSULIN LISPRO 100 [IU]/ML
0-12 INJECTION, SOLUTION INTRAVENOUS; SUBCUTANEOUS ONCE
Status: DISCONTINUED | OUTPATIENT
Start: 2024-02-23 | End: 2024-02-23 | Stop reason: HOSPADM

## 2024-02-23 RX ADMIN — ACETAMINOPHEN 325MG 975 MG: 325 TABLET ORAL at 11:23

## 2024-02-23 RX ADMIN — ONDANSETRON 4 MG: 2 INJECTION INTRAMUSCULAR; INTRAVENOUS at 11:55

## 2024-02-23 RX ADMIN — Medication 10 MG: at 12:00

## 2024-02-23 RX ADMIN — HYDROCODONE BITARTRATE AND ACETAMINOPHEN 1 TABLET: 5; 325 TABLET ORAL at 14:55

## 2024-02-23 RX ADMIN — DEXAMETHASONE SODIUM PHOSPHATE 4 MG: 4 INJECTION, SOLUTION INTRAMUSCULAR; INTRAVENOUS at 11:55

## 2024-02-23 RX ADMIN — KETOROLAC TROMETHAMINE 15 MG: 15 INJECTION, SOLUTION INTRAMUSCULAR; INTRAVENOUS at 11:55

## 2024-02-23 RX ADMIN — ONDANSETRON 4 MG: 2 INJECTION INTRAMUSCULAR; INTRAVENOUS at 13:15

## 2024-02-23 RX ADMIN — LIDOCAINE HYDROCHLORIDE 50 MG: 20 INJECTION, SOLUTION EPIDURAL; INFILTRATION; INTRACAUDAL; PERINEURAL at 11:51

## 2024-02-23 RX ADMIN — MIDAZOLAM 2 MG: 1 INJECTION, SOLUTION INTRAMUSCULAR; INTRAVENOUS at 11:46

## 2024-02-23 RX ADMIN — FENTANYL CITRATE 50 MCG: 50 INJECTION INTRAMUSCULAR; INTRAVENOUS at 11:51

## 2024-02-23 RX ADMIN — FENTANYL CITRATE 50 MCG: 50 INJECTION INTRAMUSCULAR; INTRAVENOUS at 12:07

## 2024-02-23 RX ADMIN — FENTANYL CITRATE 25 MCG: 50 INJECTION INTRAMUSCULAR; INTRAVENOUS at 13:15

## 2024-02-23 RX ADMIN — FAMOTIDINE 20 MG: 20 TABLET ORAL at 11:23

## 2024-02-23 RX ADMIN — APREPITANT 40 MG: 40 CAPSULE ORAL at 11:23

## 2024-02-23 RX ADMIN — CEFAZOLIN 2 G: 1 INJECTION, POWDER, FOR SOLUTION INTRAMUSCULAR; INTRAVENOUS at 11:55

## 2024-02-23 RX ADMIN — PROPOFOL 200 MG: 10 INJECTION, EMULSION INTRAVENOUS at 11:51

## 2024-02-23 RX ADMIN — SODIUM CHLORIDE, POTASSIUM CHLORIDE, SODIUM LACTATE AND CALCIUM CHLORIDE: 600; 310; 30; 20 INJECTION, SOLUTION INTRAVENOUS at 11:26

## 2024-02-23 ASSESSMENT — PAIN DESCRIPTION - ORIENTATION
ORIENTATION: LEFT
ORIENTATION: LEFT

## 2024-02-23 ASSESSMENT — PAIN SCALES - GENERAL
PAINLEVEL_OUTOF10: 0
PAINLEVEL_OUTOF10: 0
PAINLEVEL_OUTOF10: 7
PAINLEVEL_OUTOF10: 0
PAINLEVEL_OUTOF10: 5
PAINLEVEL_OUTOF10: 0

## 2024-02-23 ASSESSMENT — PAIN - FUNCTIONAL ASSESSMENT
PAIN_FUNCTIONAL_ASSESSMENT: ACTIVITIES ARE NOT PREVENTED
PAIN_FUNCTIONAL_ASSESSMENT: 0-10

## 2024-02-23 ASSESSMENT — PAIN DESCRIPTION - DESCRIPTORS: DESCRIPTORS: SORE

## 2024-02-23 ASSESSMENT — PAIN DESCRIPTION - LOCATION
LOCATION: KNEE
LOCATION: KNEE

## 2024-02-23 NOTE — ANESTHESIA POSTPROCEDURE EVALUATION
Department of Anesthesiology  Postprocedure Note    Patient: Dominique Anderson  MRN: [id number]  YOB: 1960  Date of evaluation: 2/23/2024    Procedure Summary       Date: 02/23/24 Room / Location: [facility] MAIN 03 / [facility] MAIN OR    Anesthesia Start: 1147 Anesthesia Stop: 1235    Procedure: LEFT KNEE ARTHROSCOPIC PARTIAL MEDIAL MENISCECTOMY (Left: Knee) Diagnosis:       Unilateral primary osteoarthritis, left knee      Complex tear of medial meniscus of left knee as current injury, subsequent encounter      (Unilateral primary osteoarthritis, left knee [M17.12])      (Complex tear of medial meniscus of left knee as current injury, subsequent encounter [S83.232D])    Surgeons: Jerome Carrion MD Responsible Provider: Mateo Hernández DO    Anesthesia Type: General ASA Status: 3            Anesthesia Type: General    Florencio Phase I: Florencio Score: 10    Florencio Phase II: Florencio Score: 10    Anesthesia Post Evaluation    Patient location during evaluation: PACU  Patient participation: complete - patient participated  Level of consciousness: awake and alert  Airway patency: patent  Nausea & Vomiting: no nausea and no vomiting  Cardiovascular status: hemodynamically stable  Respiratory status: acceptable  Hydration status: stable  Multimodal analgesia pain management approach    No notable events documented.

## 2024-02-23 NOTE — PERIOP NOTE
Patient /Family /Designee has been informed that [facility] is not responsible for patient belongings per policy and the signed [facility] Patient Agreement document.  Personal items should be sent home or checked in with security.  Patient /Family /Designee selected the following action:                            []  Send personal items home with a family member or friend                                                 []  Check in personal items with security, excluding clothing                            [x]  Maintain personal items at the bedside, against recommendation                                 by Doug Cook [facility]                                   ** If patient /family /designee chooses to maintain personal items at the bedside,                                      Complete the patient belongings inventory in the EMR.   Belongings are in PACU locker.  Contact is Hong peterson.  Number: [phone]

## 2024-02-23 NOTE — ANESTHESIA PRE PROCEDURE
Department of Anesthesiology  Preprocedure Note       Name:  Dominique Anderson   Age:  63 y.o.  :  1960                                          MRN:  [id number]         Date:  2024      Surgeon: Surgeon(s):  Jerome Carrion MD    Procedure: Procedure(s):  LEFT KNEE ARTHROSCOPIC PARTIAL MEDIAL MENISCECTOMY    Medications prior to admission:   Prior to Admission medications    Medication Sig Start Date End Date Taking? Authorizing Provider   albuterol sulfate HFA (VENTOLIN HFA) 108 (90 Base) MCG/ACT inhaler Inhale 2 puffs into the lungs every 6 hours as needed for Wheezing   Yes ProviderEmam MD   TRULICITY 3 MG/0.5ML SOPN Inject 3 mg into the skin every 7 days Every 24  Yes ProviderEmma MD   Azelastine HCl 137 MCG/SPRAY SOLN USE 2 SPRAY(S) IN EACH NOSTRIL ONCE DAILY IN THE MORNING CAN ALSO USE TWO SPRAYS IN EACH NOSTRIL AT LUNCHTIME IF NEEDED 3/8/23   ProviderEmma MD   famotidine (PEPCID) 20 MG tablet TAKE 1 TABLET BY MOUTH EVERY 12 HOURS AS NEEDED FOR REFLUX 3/7/23   ProviderEmma MD   aspirin 81 MG EC tablet Take 1 tablet by mouth every other day    Automatic Reconciliation, Ar   atorvastatin (LIPITOR) 40 MG tablet Take by mouth daily    Automatic Reconciliation, Ar   cetirizine (ZYRTEC) 10 MG tablet Take 1 tablet by mouth daily as needed 20   Automatic Reconciliation, Ar   diclofenac sodium (VOLTAREN) 1 % GEL Apply 2 g topically daily as needed 21   Automatic Reconciliation, Ar   hydroCHLOROthiazide (HYDRODIURIL) 12.5 MG tablet Take 12.5 mg by mouth daily    Automatic Reconciliation, Ar   levothyroxine (SYNTHROID) 75 MCG tablet Take by mouth every morning (before breakfast)    Automatic Reconciliation, Ar       Current medications:    Current Facility-Administered Medications   Medication Dose Route Frequency Provider Last Rate Last Admin   • lidocaine PF 1 % injection 1 mL  1 mL IntraDERmal Once PRN Tea Mcnair APRN - CRNA       •

## 2024-02-23 NOTE — OP NOTE
Operative Note      Patient: Dominique Anderson  YOB: 1960  MRN: [id number]    Date of Procedure: 2/23/2024    Pre-Op Diagnosis Codes:     * Unilateral primary osteoarthritis, left knee [M17.12]     * Complex tear of medial meniscus of left knee as current injury, subsequent encounter [S83.232D]    Post-Op Diagnosis: Post-Op Diagnosis Codes:     * Unilateral primary osteoarthritis, left knee [M17.12]     * Complex tear of medial meniscus of left knee as current injury, subsequent encounter [S83.232D]     Lateral meniscus tear  Procedure(s):  LEFT KNEE ARTHROSCOPIC PARTIAL MEDIAL MENISCECTOMY  Partial lateral meniscectomy  Surgeon(s):  Jerome Carrion MD    Assistant:   Surgical Assistant: Darion Ramos    Anesthesia: General    Estimated Blood Loss (mL): Minimal    Complications: None    Specimens:   * No specimens in log *    Implants:  * No implants in log *      Drains: * No LDAs found *    Findings: As above        Detailed Description of Procedure:   Patient was taken to the operating room [place] under general trach anesthesia with anesthesia staff.  Placed in a standard arthroscopy barlow the left knee prepped with ChloraPrep solution draped free sterile field.  Anterolateral portal was used in standard arthroscopy portal and 2 medial portals were portal with portals made with 11 blade followed by blunt trocars.  Once the arthroscope was in place the knee was systematically evaluated standard suprapatellar pouch patellofemoral joint where significant degenerative changes in the femoral trochlea almost to grade 4 changes.  8 complex tear of the posterior half of the medial meniscus was found was debrided using straight basket forceps 3 5 shaver leaving a stable rim marked degenerative changes in the medial compartment grade 4 changes in the half of the weightbearing surface.  A horizontal cleavage tear in the throughout the lateral meniscus from anterior to posterior with unstable  flap anteriorly was found was debrided using straight basket forceps 3 5 shaver leaving a stable rim.  Fluid was suctioned out the knee was injected with Marcaine and the portals were closed with 4-0 Monocryl.  Sterile dressings were applied patient tolerated procedure well was taken to recovery room without problems    Electronically signed by Jerome Carrion MD on 2/23/2024 at 12:22 PM

## 2024-02-23 NOTE — BRIEF OP NOTE
Brief Postoperative Note      Patient: Dominique Anderson  YOB: 1960  MRN: [id number]    Date of Procedure: 2/23/2024    Pre-Op Diagnosis Codes:     * Unilateral primary osteoarthritis, left knee [M17.12]     * Complex tear of medial meniscus of left knee as current injury, subsequent encounter [S83.232D]    Post-Op Diagnosis: Post-Op Diagnosis Codes:     * Unilateral primary osteoarthritis, left knee [M17.12]     * Complex tear of medial meniscus of left knee as current injury, subsequent encounter [S83.232D]     Lateral meniscus tear  Procedure(s):  LEFT KNEE ARTHROSCOPIC PARTIAL MEDIAL MENISCECTOMY  Partial lateral meniscus tear  Surgeon(s):  Jerome Carrion MD    Assistant:  Surgical Assistant: Darion Ramos    Anesthesia: General    Estimated Blood Loss (mL): Minimal    Complications: None    Specimens:   * No specimens in log *    Implants:  * No implants in log *      Drains: * No LDAs found *    Findings: As above      Electronically signed by Jerome Carrion MD on 2/23/2024 at 12:20 PM

## 2024-02-23 NOTE — H&P
Update History & Physical    The patient's History and Physical was reviewed with the patient and I examined the patient. There was no change. The surgical site was confirmed by the patient and me.       Plan: The risks, benefits, expected outcome, and alternative to the recommended procedure have been discussed with the patient. Patient understands and wants to proceed with the procedure.     Electronically signed by Jerome Carrion MD on 2/23/2024 at 11:14 AM

## 2024-03-07 ENCOUNTER — OFFICE VISIT (OUTPATIENT)
Age: 64
End: 2024-03-07

## 2024-03-07 VITALS — HEIGHT: 64 IN | BODY MASS INDEX: 37.28 KG/M2

## 2024-03-07 DIAGNOSIS — S83.232D COMPLEX TEAR OF MEDIAL MENISCUS OF LEFT KNEE AS CURRENT INJURY, SUBSEQUENT ENCOUNTER: Primary | ICD-10-CM

## 2024-03-07 DIAGNOSIS — M17.12 UNILATERAL PRIMARY OSTEOARTHRITIS, LEFT KNEE: ICD-10-CM

## 2024-03-07 PROCEDURE — 99024 POSTOP FOLLOW-UP VISIT: CPT | Performed by: PHYSICIAN ASSISTANT

## 2024-03-07 NOTE — PATIENT INSTRUCTIONS
You may now shower and get your incisions wet. We recommend starting scar massage now to your incision(s). Take Vitamin E, Cocoa Butter or Scar Cream and massage the incision 2 times a day. This will help soften your incisions and help de-sensitize the skin as the nerves \"wake up\".

## 2024-03-07 NOTE — PROGRESS NOTES
Patient: Dominique Anderson  YOB: 1960       HISTORY:  The patient presents for reevaluation of her left knee status post arthroscopic partial medial and lateral menisectomy with grade IV chondromalacia on 2/23/24.  Patient is improved, states pain is a 5 out of 10.  she has not gone to physical therapy.  Patient denies any fever, chills, chest pain, shortness of breath or calf pain. The remainder of the review of systems is negative. There are no new illness or injuries to report since last seen in the office. No changes in medications, allergies, social or family history.      PHYSICAL EXAMINATION:    There were no vitals taken for this visit.  The patient is a well-developed, well-nourished female in no acute distress.  The patient is alert and oriented times three. The patient appears to be well groomed. Mood and affect are normal.   ORTHOPEDIC EXAM of left knee:  Inspection: Effusion present,  incisions clean, dry intact, sutures in place  TTP: medial and lateral joint line  Range of motion: 0-110 flexion  Stability: Stable  Strength: 5/5  2+ distal pulses    IMPRESSION:  Status post left knee arthroscopic partial medial and lateral menisectomy with degenerative arthritis with joint space narrowing.     PLAN: Incisions cleaned. Surgery was discussed at length today. Stressed to patient that nothing causes an increase in pain or swelling. Patient is weight bearing as tolerated. OK to d/c use of crutches/walker if still utilizing. Will set up with physical therapy home program.  Patient does not request refill of pain medication. Will auth visco given degen arthritis with joint space narrowing. Patient will follow up in 4 weeks.    Skylar Gamboa PA-C  [facility]

## 2024-04-08 ENCOUNTER — OFFICE VISIT (OUTPATIENT)
Age: 64
End: 2024-04-08
Payer: MEDICARE

## 2024-04-08 VITALS — WEIGHT: 217 LBS | BODY MASS INDEX: 37.05 KG/M2 | HEIGHT: 64 IN

## 2024-04-08 DIAGNOSIS — S83.232D COMPLEX TEAR OF MEDIAL MENISCUS OF LEFT KNEE AS CURRENT INJURY, SUBSEQUENT ENCOUNTER: Primary | ICD-10-CM

## 2024-04-08 DIAGNOSIS — M17.12 UNILATERAL PRIMARY OSTEOARTHRITIS, LEFT KNEE: ICD-10-CM

## 2024-04-08 PROCEDURE — 99024 POSTOP FOLLOW-UP VISIT: CPT | Performed by: PHYSICIAN ASSISTANT

## 2024-04-08 PROCEDURE — 20611 DRAIN/INJ JOINT/BURSA W/US: CPT | Performed by: PHYSICIAN ASSISTANT

## 2024-04-08 RX ORDER — HYALURONATE SODIUM 10 MG/ML
20 SYRINGE (ML) INTRAARTICULAR ONCE
Status: COMPLETED | OUTPATIENT
Start: 2024-04-08 | End: 2024-04-09

## 2024-04-08 NOTE — PROGRESS NOTES
Patient: Dominique Anderson  YOB: 1960       HISTORY:  The patient presents for reevaluation of her left knee status post arthroscopic partial medial and lateral menisectomy with grade IV chondromalacia on 2/23/24.  Patient is improved, states pain is a 5 out of 10.  she has not gone to physical therapy.  Patient denies any fever, chills, chest pain, shortness of breath or calf pain. The remainder of the review of systems is negative. There are no new illness or injuries to report since last seen in the office. No changes in medications, allergies, social or family history.      PHYSICAL EXAMINATION:    There were no vitals taken for this visit.  The patient is a well-developed, well-nourished female in no acute distress.  The patient is alert and oriented times three. The patient appears to be well groomed. Mood and affect are normal.   ORTHOPEDIC EXAM of left knee:  Inspection: Effusion present,  incisions well healed  TTP: medial and lateral joint line  Range of motion: 0-110 flexion  Stability: Stable  Strength: 5/5  2+ distal pulses    PROCEDURES: left knee Injection with Ultrasound Guidance    Indication:  knee pain/swelling    After sterile prep, 2cc Euflexxa were injected into the left knee intraarticular under ultrasound guidance. Ultrasound images captured and scanned into patient's chart.        [facility] - [facility]  OFFICE PROCEDURE NOTE        Chart reviewed for the following:  Skylar PRICE PA, have reviewed the History, Physical and updated the Allergic reactions for Dominique Anderson     TIME OUT performed immediately prior to start of procedure:  Skylar PRICE PA-C, have performed the following reviews on Dominique Anderson prior to the start of the procedure:            * Patient was identified by name and date of birth   * Agreement on procedure being performed was verified  * Risks and Benefits

## 2024-04-09 RX ADMIN — Medication 20 MG: at 16:11

## 2024-04-16 ENCOUNTER — OFFICE VISIT (OUTPATIENT)
Age: 64
End: 2024-04-16
Payer: MEDICARE

## 2024-04-16 VITALS — HEIGHT: 64 IN | WEIGHT: 217 LBS | BODY MASS INDEX: 37.05 KG/M2

## 2024-04-16 DIAGNOSIS — M17.12 UNILATERAL PRIMARY OSTEOARTHRITIS, LEFT KNEE: ICD-10-CM

## 2024-04-16 DIAGNOSIS — S83.232D COMPLEX TEAR OF MEDIAL MENISCUS OF LEFT KNEE AS CURRENT INJURY, SUBSEQUENT ENCOUNTER: Primary | ICD-10-CM

## 2024-04-16 PROCEDURE — 99024 POSTOP FOLLOW-UP VISIT: CPT | Performed by: PHYSICIAN ASSISTANT

## 2024-04-16 PROCEDURE — 20611 DRAIN/INJ JOINT/BURSA W/US: CPT | Performed by: PHYSICIAN ASSISTANT

## 2024-04-16 RX ORDER — HYALURONATE SODIUM 10 MG/ML
20 SYRINGE (ML) INTRAARTICULAR ONCE
Status: COMPLETED | OUTPATIENT
Start: 2024-04-16 | End: 2024-04-16

## 2024-04-16 RX ADMIN — Medication 20 MG: at 13:33

## 2024-04-16 NOTE — PROGRESS NOTES
Patient: Dominique Anderson                MRN: [id number]       SSN: [id number]  YOB: 1960        AGE: 63 y.o.        SEX: female  There is no height or weight on file to calculate BMI.    PCP: Mya Pham MD  04/16/24    No chief complaint on file.      HISTORY:  Dominique Anderson is a 63 y.o. female who is seen for reevaluation of Left knee here for 2nd injection of euflexxa    PROCEDURE: Left  knee Injection with Ultrasound Guidance    Indication:Leftknee pain/swelling    After sterile prep, 2 cc of euflexxa were injected into the Left Knee. Intra-articular Ultrasound images captured using [employer] Ultrasound machine using a frequency of 10 MHz with a linear transducer and scanned into patient's chart.       OFFICE PROCEDURE PROGRESS NOTE        Chart reviewed for the following:   Skylar PRICE PA-C, have reviewed the History, Physical and updated the Allergic reactions for Dominique Anderson     TIME OUT performed immediately prior to start of procedure:   Skylar PRICE PA-C, have performed the following reviews on Dominique Anderson prior to the start of the procedure:            * Patient was identified by name and date of birth   * Agreement on procedure being performed was verified  * Risks and Benefits explained to the patient  * Procedure site verified and marked as necessary  * Patient was positioned for comfort  * Consent was signed and verified     Time: 11:22 AM       Date of procedure: 4/16/2024    Procedure performed by:  JOHN Ibarra    Provider assisted by: None     How tolerated by patient: tolerated the procedure well with no complications    Comments: none    IMPRESSION:     ICD-10-CM    1. Complex tear of medial meniscus of left knee as current injury, subsequent encounter  S83.232D       2. Unilateral primary osteoarthritis, left knee  M17.12            PLAN:  Ms. Anderson will return in one week

## 2024-04-23 ENCOUNTER — OFFICE VISIT (OUTPATIENT)
Age: 64
End: 2024-04-23
Payer: MEDICARE

## 2024-04-23 VITALS — BODY MASS INDEX: 37.05 KG/M2 | HEIGHT: 64 IN | WEIGHT: 217 LBS

## 2024-04-23 DIAGNOSIS — M17.12 UNILATERAL PRIMARY OSTEOARTHRITIS, LEFT KNEE: Primary | ICD-10-CM

## 2024-04-23 PROCEDURE — 99213 OFFICE O/P EST LOW 20 MIN: CPT | Performed by: PHYSICIAN ASSISTANT

## 2024-04-23 PROCEDURE — 1090F PRES/ABSN URINE INCON ASSESS: CPT | Performed by: PHYSICIAN ASSISTANT

## 2024-04-23 PROCEDURE — 3017F COLORECTAL CA SCREEN DOC REV: CPT | Performed by: PHYSICIAN ASSISTANT

## 2024-04-23 PROCEDURE — 20611 DRAIN/INJ JOINT/BURSA W/US: CPT | Performed by: PHYSICIAN ASSISTANT

## 2024-04-23 PROCEDURE — 1036F TOBACCO NON-USER: CPT | Performed by: PHYSICIAN ASSISTANT

## 2024-04-23 PROCEDURE — 1123F ACP DISCUSS/DSCN MKR DOCD: CPT | Performed by: PHYSICIAN ASSISTANT

## 2024-04-23 PROCEDURE — G8400 PT W/DXA NO RESULTS DOC: HCPCS | Performed by: PHYSICIAN ASSISTANT

## 2024-04-23 PROCEDURE — G8427 DOCREV CUR MEDS BY ELIG CLIN: HCPCS | Performed by: PHYSICIAN ASSISTANT

## 2024-04-23 PROCEDURE — G8417 CALC BMI ABV UP PARAM F/U: HCPCS | Performed by: PHYSICIAN ASSISTANT

## 2024-04-23 RX ORDER — LIDOCAINE HYDROCHLORIDE 10 MG/ML
4 INJECTION, SOLUTION INFILTRATION; PERINEURAL ONCE
Status: COMPLETED | OUTPATIENT
Start: 2024-04-23 | End: 2024-04-23

## 2024-04-23 RX ORDER — TRIAMCINOLONE ACETONIDE 40 MG/ML
40 INJECTION, SUSPENSION INTRA-ARTICULAR; INTRAMUSCULAR ONCE
Status: COMPLETED | OUTPATIENT
Start: 2024-04-23 | End: 2024-04-23

## 2024-04-23 RX ADMIN — LIDOCAINE HYDROCHLORIDE 4 ML: 10 INJECTION, SOLUTION INFILTRATION; PERINEURAL at 11:37

## 2024-04-23 RX ADMIN — TRIAMCINOLONE ACETONIDE 40 MG: 40 INJECTION, SUSPENSION INTRA-ARTICULAR; INTRAMUSCULAR at 11:38

## 2024-04-23 NOTE — PROGRESS NOTES
Dominique Anderson  1960   Chief Complaint   Patient presents with    Knee Pain     LEFT        HISTORY OF PRESENT ILLNESS  Dominique Anderson is a 63 y.o. female who presents today for reevaluation of left knee pain.  She is here for her third and final Euflexxa injection.  She states the first 2 injections have been causing an increase in pain.  She feels like she is limping more.  She feels more swollen today. Pain is a 8/10.    Patient denies any fever, chills, chest pain, shortness of breath or calf pain. The remainder of the review of systems is negative. There are no new illness or injuries other than that mentioned above to report since last seen in the office. No changes in medications, allergies, social or family history.      PHYSICAL EXAM:   Ht 1.626 m (5' 4\")   Wt 98.4 kg (217 lb)   BMI 37.25 kg/m²   The patient is a well-developed, well-nourished female   in no acute distress.  The patient is alert and oriented times three.  The patient is alert and oriented times three. Mood and affect are normal.  LYMPHATIC: lymph nodes are not enlarged and are within normal limits  SKIN: normal in color and non tender to palpation. There are no bruises or abrasions noted.   NEUROLOGICAL: Motor sensory exam is within normal limits. Reflexes are equal bilaterally. There is normal sensation to pinprick and light touch  MUSCULOSKELETAL:  Examination Left knee   Skin Intact   Range of motion 5-115   Effusion +   Medial joint line tenderness +   Lateral joint line tenderness -   Tenderness Pes Bursa -   Tenderness insertion MCL -   Tenderness insertion LCL -   Lizet’s -   Patella crepitus -   Patella grind -   Lachman -   Pivot shift -   Anterior drawer -   Posterior drawer -   Varus stress -   Valgus stress -   Neurovascular Intact   Calf Swelling and Tenderness to Palpation -   Ayala's Test -   Hamstring Cord Tightness -          PROCEDURE: left knee Injection with Ultrasound

## 2024-04-24 ENCOUNTER — HOSPITAL ENCOUNTER (OUTPATIENT)
Facility: HOSPITAL | Age: 64
Discharge: HOME | End: 2024-04-27
Payer: MEDICARE

## 2024-04-24 DIAGNOSIS — M17.12 UNILATERAL PRIMARY OSTEOARTHRITIS, LEFT KNEE: ICD-10-CM

## 2024-04-24 PROCEDURE — 73721 MRI JNT OF LWR EXTRE W/O DYE: CPT

## 2024-05-01 ENCOUNTER — OFFICE VISIT (OUTPATIENT)
Age: 64
End: 2024-05-01
Payer: MEDICARE

## 2024-05-01 VITALS
HEART RATE: 56 BPM | OXYGEN SATURATION: 97 % | TEMPERATURE: 96.6 F | SYSTOLIC BLOOD PRESSURE: 135 MMHG | WEIGHT: 205 LBS | DIASTOLIC BLOOD PRESSURE: 57 MMHG | HEIGHT: 64 IN | BODY MASS INDEX: 35 KG/M2

## 2024-05-01 DIAGNOSIS — M54.42 CHRONIC BILATERAL LOW BACK PAIN WITH BILATERAL SCIATICA: Primary | ICD-10-CM

## 2024-05-01 DIAGNOSIS — M47.819 FACET ARTHROPATHY: ICD-10-CM

## 2024-05-01 DIAGNOSIS — M54.41 CHRONIC BILATERAL LOW BACK PAIN WITH BILATERAL SCIATICA: Primary | ICD-10-CM

## 2024-05-01 DIAGNOSIS — M79.18 MYOFASCIAL PAIN: ICD-10-CM

## 2024-05-01 DIAGNOSIS — G62.9 POLYNEUROPATHY, UNSPECIFIED: ICD-10-CM

## 2024-05-01 DIAGNOSIS — G89.29 CHRONIC BILATERAL LOW BACK PAIN WITH BILATERAL SCIATICA: Primary | ICD-10-CM

## 2024-05-01 PROCEDURE — G8400 PT W/DXA NO RESULTS DOC: HCPCS | Performed by: NURSE PRACTITIONER

## 2024-05-01 PROCEDURE — 3075F SYST BP GE 130 - 139MM HG: CPT | Performed by: NURSE PRACTITIONER

## 2024-05-01 PROCEDURE — 3017F COLORECTAL CA SCREEN DOC REV: CPT | Performed by: NURSE PRACTITIONER

## 2024-05-01 PROCEDURE — 1090F PRES/ABSN URINE INCON ASSESS: CPT | Performed by: NURSE PRACTITIONER

## 2024-05-01 PROCEDURE — 1036F TOBACCO NON-USER: CPT | Performed by: NURSE PRACTITIONER

## 2024-05-01 PROCEDURE — 99214 OFFICE O/P EST MOD 30 MIN: CPT | Performed by: NURSE PRACTITIONER

## 2024-05-01 PROCEDURE — G8427 DOCREV CUR MEDS BY ELIG CLIN: HCPCS | Performed by: NURSE PRACTITIONER

## 2024-05-01 PROCEDURE — 3078F DIAST BP <80 MM HG: CPT | Performed by: NURSE PRACTITIONER

## 2024-05-01 PROCEDURE — 1123F ACP DISCUSS/DSCN MKR DOCD: CPT | Performed by: NURSE PRACTITIONER

## 2024-05-01 PROCEDURE — G8417 CALC BMI ABV UP PARAM F/U: HCPCS | Performed by: NURSE PRACTITIONER

## 2024-05-01 RX ORDER — METHOCARBAMOL 750 MG/1
750 TABLET, FILM COATED ORAL 2 TIMES DAILY PRN
Qty: 60 TABLET | Refills: 1 | Status: SHIPPED | OUTPATIENT
Start: 2024-05-01

## 2024-05-01 RX ORDER — PREGABALIN 150 MG/1
150 CAPSULE ORAL 2 TIMES DAILY
Qty: 60 CAPSULE | Refills: 4 | Status: SHIPPED | OUTPATIENT
Start: 2024-05-31 | End: 2024-10-28

## 2024-05-01 RX ORDER — PREGABALIN 75 MG/1
CAPSULE ORAL
Qty: 120 CAPSULE | Refills: 0 | Status: SHIPPED | OUTPATIENT
Start: 2024-05-01 | End: 2024-05-31

## 2024-05-01 NOTE — PROGRESS NOTES
Chief complaint   Chief Complaint   Patient presents with    Lower Back Pain       History of Present Illness:  Dominique Anderson is a  63 y.o.  female who comes in today after last being seen March 24, 2023.  She states during her negotiations with [facility] last year she was unable to come back due to that.  She is now with [facility] and will end on his work out a contract with us.  She states she has been off the Lyrica 300 mg daily for about a year now and her back pain and bilateral leg pain that radiates down to her feet with numbness and tingling in her feet have greatly increased.  She would like to get back on that.  She was also on Cymbalta 20 mg daily but she did not really feel like that helped that much she is diabetic and her blood sugar is running between 170 and 200.  She gets a lot of neuropathy in her legs and feet.  She also takes as needed Robaxin 750 mg.  She would like to have a refill of the Robaxin and start back on the Lyrica since it did control her neuropathic pain so well.  She denies fever bowel bladder dysfunction.  Since we saw her she underwent a left meniscectomy with Dr. Vega on February 23, 2024.  She is recovering nicely.      Physical Exam: Patient is a 63-year-old female well-developed well-nourished who is alert and oriented with a normal mood and affect.  She has a full weightbearing nonantalgic gait.  She has 4-5 strength bilateral lower extremities.  Negative straight leg raise.  She does not use any assist device.  She has pain with hyperextension lumbar spine.      Assessment and Plan:.  This is a patient who has chronic low back pain with bilateral radicular pain, polyneuropathy and facet arthropathy along with diabetes.  I will restart her Lyrica at 75 mg twice a day and after a week she can go to 150 twice a day.  We will continue her at that dose until we see her back in 6 months.  If she has any problems with the medication she

## 2024-05-01 NOTE — PROGRESS NOTES
Dominique Anderson presents today for   Chief Complaint   Patient presents with    Lower Back Pain       Is someone accompanying this pt? no    Is the patient using any DME equipment during OV? no        Coordination of Care:  1. Have you been to the ER, urgent care clinic since your last visit? no  Hospitalized since your last visit? No, pt had an out pt knee surgery on left knee    2. Have you seen or consulted any other health care providers outside of the [facility] since your last visit? Yes ortho Include any pap smears or colon screening. no

## 2024-05-30 ENCOUNTER — OFFICE VISIT (OUTPATIENT)
Age: 64
End: 2024-05-30
Payer: MEDICARE

## 2024-05-30 DIAGNOSIS — S83.242A OTHER TEAR OF MEDIAL MENISCUS, CURRENT INJURY, LEFT KNEE, INITIAL ENCOUNTER: Primary | ICD-10-CM

## 2024-05-30 DIAGNOSIS — M17.12 UNILATERAL PRIMARY OSTEOARTHRITIS, LEFT KNEE: ICD-10-CM

## 2024-05-30 PROCEDURE — 99214 OFFICE O/P EST MOD 30 MIN: CPT | Performed by: PHYSICIAN ASSISTANT

## 2024-05-30 PROCEDURE — G8400 PT W/DXA NO RESULTS DOC: HCPCS | Performed by: PHYSICIAN ASSISTANT

## 2024-05-30 PROCEDURE — 1123F ACP DISCUSS/DSCN MKR DOCD: CPT | Performed by: PHYSICIAN ASSISTANT

## 2024-05-30 PROCEDURE — 1090F PRES/ABSN URINE INCON ASSESS: CPT | Performed by: PHYSICIAN ASSISTANT

## 2024-05-30 PROCEDURE — G8427 DOCREV CUR MEDS BY ELIG CLIN: HCPCS | Performed by: PHYSICIAN ASSISTANT

## 2024-05-30 PROCEDURE — 3017F COLORECTAL CA SCREEN DOC REV: CPT | Performed by: PHYSICIAN ASSISTANT

## 2024-05-30 PROCEDURE — 1036F TOBACCO NON-USER: CPT | Performed by: PHYSICIAN ASSISTANT

## 2024-05-30 PROCEDURE — G8417 CALC BMI ABV UP PARAM F/U: HCPCS | Performed by: PHYSICIAN ASSISTANT

## 2024-05-30 NOTE — PROGRESS NOTES
Dominique Anderson  1960   No chief complaint on file.       HISTORY OF PRESENT ILLNESS  Dominique Anderson is a 63 y.o. female who presents today for reevaluation of left knee pain.  Patient was getting euflexxa injections. She states the first 2 injections had been causing an increase in pain. The series was paused and a cortisone injection was given. MRI was ordered as well.  Today she states she feels much better. Pain is a 0/10.    Patient denies any fever, chills, chest pain, shortness of breath or calf pain. The remainder of the review of systems is negative. There are no new illness or injuries other than that mentioned above to report since last seen in the office. No changes in medications, allergies, social or family history.      PHYSICAL EXAM:   There were no vitals taken for this visit.  The patient is a well-developed, well-nourished female   in no acute distress.  The patient is alert and oriented times three.  The patient is alert and oriented times three. Mood and affect are normal.  LYMPHATIC: lymph nodes are not enlarged and are within normal limits  SKIN: normal in color and non tender to palpation. There are no bruises or abrasions noted.   NEUROLOGICAL: Motor sensory exam is within normal limits. Reflexes are equal bilaterally. There is normal sensation to pinprick and light touch  MUSCULOSKELETAL:  Examination Left knee   Skin Intact   Range of motion 0-115   Effusion -   Medial joint line tenderness -   Lateral joint line tenderness -   Tenderness Pes Bursa -   Tenderness insertion MCL -   Tenderness insertion LCL -   Lizet’s -   Patella crepitus -   Patella grind -   Lachman -   Pivot shift -   Anterior drawer -   Posterior drawer -   Varus stress -   Valgus stress -   Neurovascular Intact   Calf Swelling and Tenderness to Palpation -   Ayala's Test -   Hamstring Cord Tightness -          PROCEDURE: none    IMAGING: MRI of left knee read and reviewed by

## 2024-07-12 DIAGNOSIS — M79.18 MYOFASCIAL PAIN: ICD-10-CM

## 2024-07-14 RX ORDER — METHOCARBAMOL 750 MG/1
TABLET, FILM COATED ORAL
Qty: 60 TABLET | Refills: 1 | Status: SHIPPED | OUTPATIENT
Start: 2024-07-14

## 2024-08-23 ENCOUNTER — HOSPITAL ENCOUNTER (OUTPATIENT)
Facility: HOSPITAL | Age: 64
End: 2024-08-23
Attending: FAMILY MEDICINE
Payer: MEDICARE

## 2024-08-23 VITALS — HEIGHT: 64 IN | BODY MASS INDEX: 36.88 KG/M2 | WEIGHT: 216 LBS

## 2024-08-23 DIAGNOSIS — Z12.31 VISIT FOR SCREENING MAMMOGRAM: ICD-10-CM

## 2024-08-23 PROCEDURE — 77063 BREAST TOMOSYNTHESIS BI: CPT

## 2024-09-10 ENCOUNTER — OFFICE VISIT (OUTPATIENT)
Age: 64
End: 2024-09-10
Payer: MEDICARE

## 2024-09-10 DIAGNOSIS — S83.242A OTHER TEAR OF MEDIAL MENISCUS, CURRENT INJURY, LEFT KNEE, INITIAL ENCOUNTER: Primary | ICD-10-CM

## 2024-09-10 PROCEDURE — 1123F ACP DISCUSS/DSCN MKR DOCD: CPT | Performed by: ORTHOPAEDIC SURGERY

## 2024-09-10 PROCEDURE — G8417 CALC BMI ABV UP PARAM F/U: HCPCS | Performed by: ORTHOPAEDIC SURGERY

## 2024-09-10 PROCEDURE — G8427 DOCREV CUR MEDS BY ELIG CLIN: HCPCS | Performed by: ORTHOPAEDIC SURGERY

## 2024-09-10 PROCEDURE — 1036F TOBACCO NON-USER: CPT | Performed by: ORTHOPAEDIC SURGERY

## 2024-09-10 PROCEDURE — 3017F COLORECTAL CA SCREEN DOC REV: CPT | Performed by: ORTHOPAEDIC SURGERY

## 2024-09-10 PROCEDURE — 99214 OFFICE O/P EST MOD 30 MIN: CPT | Performed by: ORTHOPAEDIC SURGERY

## 2024-09-10 PROCEDURE — G8400 PT W/DXA NO RESULTS DOC: HCPCS | Performed by: ORTHOPAEDIC SURGERY

## 2024-09-10 PROCEDURE — 20611 DRAIN/INJ JOINT/BURSA W/US: CPT | Performed by: ORTHOPAEDIC SURGERY

## 2024-09-10 PROCEDURE — 1090F PRES/ABSN URINE INCON ASSESS: CPT | Performed by: ORTHOPAEDIC SURGERY

## 2024-09-10 RX ORDER — TRIAMCINOLONE ACETONIDE 40 MG/ML
40 INJECTION, SUSPENSION INTRA-ARTICULAR; INTRAMUSCULAR ONCE
Status: COMPLETED | OUTPATIENT
Start: 2024-09-10 | End: 2024-09-10

## 2024-09-10 RX ORDER — LIDOCAINE HYDROCHLORIDE 10 MG/ML
4 INJECTION, SOLUTION INFILTRATION; PERINEURAL ONCE
Status: COMPLETED | OUTPATIENT
Start: 2024-09-10 | End: 2024-09-10

## 2024-09-10 RX ADMIN — LIDOCAINE HYDROCHLORIDE 4 ML: 10 INJECTION, SOLUTION INFILTRATION; PERINEURAL at 15:25

## 2024-09-10 RX ADMIN — TRIAMCINOLONE ACETONIDE 40 MG: 40 INJECTION, SUSPENSION INTRA-ARTICULAR; INTRAMUSCULAR at 15:25

## 2024-10-17 ENCOUNTER — OFFICE VISIT (OUTPATIENT)
Age: 64
End: 2024-10-17
Payer: MEDICARE

## 2024-10-17 VITALS
RESPIRATION RATE: 19 BRPM | HEART RATE: 63 BPM | OXYGEN SATURATION: 96 % | WEIGHT: 213 LBS | BODY MASS INDEX: 36.36 KG/M2 | TEMPERATURE: 97.9 F

## 2024-10-17 DIAGNOSIS — G89.29 CHRONIC BILATERAL LOW BACK PAIN WITH BILATERAL SCIATICA: ICD-10-CM

## 2024-10-17 DIAGNOSIS — M54.41 CHRONIC BILATERAL LOW BACK PAIN WITH BILATERAL SCIATICA: ICD-10-CM

## 2024-10-17 DIAGNOSIS — G62.9 POLYNEUROPATHY, UNSPECIFIED: ICD-10-CM

## 2024-10-17 DIAGNOSIS — M47.819 FACET ARTHROPATHY: ICD-10-CM

## 2024-10-17 DIAGNOSIS — M54.42 CHRONIC BILATERAL LOW BACK PAIN WITH BILATERAL SCIATICA: ICD-10-CM

## 2024-10-17 PROCEDURE — 1090F PRES/ABSN URINE INCON ASSESS: CPT | Performed by: NURSE PRACTITIONER

## 2024-10-17 PROCEDURE — 99214 OFFICE O/P EST MOD 30 MIN: CPT | Performed by: NURSE PRACTITIONER

## 2024-10-17 PROCEDURE — 3017F COLORECTAL CA SCREEN DOC REV: CPT | Performed by: NURSE PRACTITIONER

## 2024-10-17 PROCEDURE — 1123F ACP DISCUSS/DSCN MKR DOCD: CPT | Performed by: NURSE PRACTITIONER

## 2024-10-17 PROCEDURE — 1036F TOBACCO NON-USER: CPT | Performed by: NURSE PRACTITIONER

## 2024-10-17 PROCEDURE — G8417 CALC BMI ABV UP PARAM F/U: HCPCS | Performed by: NURSE PRACTITIONER

## 2024-10-17 PROCEDURE — G8484 FLU IMMUNIZE NO ADMIN: HCPCS | Performed by: NURSE PRACTITIONER

## 2024-10-17 PROCEDURE — G8427 DOCREV CUR MEDS BY ELIG CLIN: HCPCS | Performed by: NURSE PRACTITIONER

## 2024-10-17 PROCEDURE — G8400 PT W/DXA NO RESULTS DOC: HCPCS | Performed by: NURSE PRACTITIONER

## 2024-10-17 RX ORDER — PREGABALIN 150 MG/1
150 CAPSULE ORAL 2 TIMES DAILY
Qty: 60 CAPSULE | Refills: 5 | Status: SHIPPED | OUTPATIENT
Start: 2024-10-17 | End: 2025-04-15

## 2024-10-17 NOTE — PROGRESS NOTES
Wt 96.6 kg (213 lb)   SpO2 96%   BMI 36.36 kg/m²   Pain Scale: 7/10      We have informed Dominique MyrickSorayaEdv to notify us for immediate appointment if she has any worsening neurogical symptoms or if an emergency situation presents, then call [phone]    Please note that this dictation was completed with [employer], the computer voice recognition software.  Quite often unanticipated grammatical, syntax, homophones, and other interpretive errors are inadvertently transcribed by the computer software.  Please disregard these errors.  Please excuse any errors that have escaped final proofreading.     Nirmala Novak, NAY - NP

## 2024-11-19 NOTE — PATIENT INSTRUCTIONS
Dr. Flor Solitario Knee Arthroscopy Surgery    What is the surgery? This is an outpatient procedure at either [address] [facility] or [address] [place] [address] Loop will be completely asleep for procedure. Dr. Flor Solitario will make 2 small incisions in your knee. He will take a tour of your knee with the camera and then address the meniscal tear(s). We will be able to evaluate if any arthritis in your knee but this surgery is not to treat your arthritis. Total surgery takes about 25-30 mins     What can you expect after surgery? You will have a bulky dressing on your knee that you can remove 2 days after surgery. You will be able to shower 2 days after surgery but no soaking in a bath, hot tub, ocean or pool x 2 weeks to allow for full wound healing. No special brace is needed. You will be on crutches or a walker when you leave the hospital. You can place weight on your leg as tolerated starting immediately. You are usually on crutches or your walker for about 4-5 days. Even though you can place weight on your leg, we recommend no walking or standing longer than 10mins for the first week. We will gradually increase your activities after that point. Dr. Flor Solitario will start physical therapy for you when you return for your 1 week post op apt  It will take your 6-8 weeks to fully recover from your surgery. When can I return to work? Most patients return to desk work only after 1-2 weeks. We recommend no prolonged walking or standing, climbing, kneeling, or crawling x 6-8 weeks. Not all knee arthroscopies are the same. The specifics of your individual case will be discussed at length with you by Dr. Flor Solitario and his Physician Assistant. Ari Blake  Surgical Coordinator  [address] [facility]. [email or web address]  [facility]  [email or web address]  P: [phone]  F: [phone]

## 2025-02-19 ENCOUNTER — TRANSCRIBE ORDERS (OUTPATIENT)
Facility: HOSPITAL | Age: 65
End: 2025-02-19

## 2025-02-19 ENCOUNTER — HOSPITAL ENCOUNTER (OUTPATIENT)
Facility: HOSPITAL | Age: 65
Discharge: HOME | End: 2025-02-22
Payer: MEDICARE

## 2025-02-19 DIAGNOSIS — M17.12 OSTEOARTHRITIS OF LEFT KNEE, UNSPECIFIED OSTEOARTHRITIS TYPE: ICD-10-CM

## 2025-02-19 DIAGNOSIS — M17.12 OSTEOARTHRITIS OF LEFT KNEE, UNSPECIFIED OSTEOARTHRITIS TYPE: Primary | ICD-10-CM

## 2025-02-19 PROCEDURE — 73562 X-RAY EXAM OF KNEE 3: CPT

## 2025-04-19 DIAGNOSIS — G62.9 POLYNEUROPATHY, UNSPECIFIED: ICD-10-CM

## 2025-04-19 DIAGNOSIS — G89.29 CHRONIC BILATERAL LOW BACK PAIN WITH BILATERAL SCIATICA: ICD-10-CM

## 2025-04-19 DIAGNOSIS — M47.819 FACET ARTHROPATHY: ICD-10-CM

## 2025-04-19 DIAGNOSIS — M54.41 CHRONIC BILATERAL LOW BACK PAIN WITH BILATERAL SCIATICA: ICD-10-CM

## 2025-04-19 DIAGNOSIS — M54.42 CHRONIC BILATERAL LOW BACK PAIN WITH BILATERAL SCIATICA: ICD-10-CM

## 2025-04-20 RX ORDER — PREGABALIN 150 MG/1
CAPSULE ORAL
Qty: 60 CAPSULE | Refills: 0 | Status: SHIPPED | OUTPATIENT
Start: 2025-04-20 | End: 2025-05-20

## 2025-04-24 ENCOUNTER — OFFICE VISIT (OUTPATIENT)
Age: 65
End: 2025-04-24
Payer: MEDICARE

## 2025-04-24 VITALS
HEIGHT: 64 IN | BODY MASS INDEX: 38.07 KG/M2 | HEART RATE: 52 BPM | WEIGHT: 223 LBS | TEMPERATURE: 98 F | OXYGEN SATURATION: 99 %

## 2025-04-24 DIAGNOSIS — M47.819 FACET ARTHROPATHY: ICD-10-CM

## 2025-04-24 DIAGNOSIS — G89.29 CHRONIC BILATERAL LOW BACK PAIN WITH BILATERAL SCIATICA: ICD-10-CM

## 2025-04-24 DIAGNOSIS — M54.42 CHRONIC BILATERAL LOW BACK PAIN WITH BILATERAL SCIATICA: ICD-10-CM

## 2025-04-24 DIAGNOSIS — G62.9 POLYNEUROPATHY, UNSPECIFIED: ICD-10-CM

## 2025-04-24 DIAGNOSIS — M54.41 CHRONIC BILATERAL LOW BACK PAIN WITH BILATERAL SCIATICA: ICD-10-CM

## 2025-04-24 PROCEDURE — 3017F COLORECTAL CA SCREEN DOC REV: CPT | Performed by: NURSE PRACTITIONER

## 2025-04-24 PROCEDURE — G8427 DOCREV CUR MEDS BY ELIG CLIN: HCPCS | Performed by: NURSE PRACTITIONER

## 2025-04-24 PROCEDURE — 99214 OFFICE O/P EST MOD 30 MIN: CPT | Performed by: NURSE PRACTITIONER

## 2025-04-24 PROCEDURE — 1126F AMNT PAIN NOTED NONE PRSNT: CPT | Performed by: NURSE PRACTITIONER

## 2025-04-24 PROCEDURE — G8417 CALC BMI ABV UP PARAM F/U: HCPCS | Performed by: NURSE PRACTITIONER

## 2025-04-24 PROCEDURE — 1090F PRES/ABSN URINE INCON ASSESS: CPT | Performed by: NURSE PRACTITIONER

## 2025-04-24 PROCEDURE — 1036F TOBACCO NON-USER: CPT | Performed by: NURSE PRACTITIONER

## 2025-04-24 PROCEDURE — 1160F RVW MEDS BY RX/DR IN RCRD: CPT | Performed by: NURSE PRACTITIONER

## 2025-04-24 PROCEDURE — 1123F ACP DISCUSS/DSCN MKR DOCD: CPT | Performed by: NURSE PRACTITIONER

## 2025-04-24 PROCEDURE — G8400 PT W/DXA NO RESULTS DOC: HCPCS | Performed by: NURSE PRACTITIONER

## 2025-04-24 PROCEDURE — 1159F MED LIST DOCD IN RCRD: CPT | Performed by: NURSE PRACTITIONER

## 2025-04-24 RX ORDER — PREGABALIN 150 MG/1
150 CAPSULE ORAL 2 TIMES DAILY
Qty: 60 CAPSULE | Refills: 5 | Status: SHIPPED | OUTPATIENT
Start: 2025-05-21 | End: 2025-11-17

## 2025-04-24 NOTE — PROGRESS NOTES
Chief complaint   Chief Complaint   Patient presents with    Back Pain       History of Present Illness:  Dominique Anderson is a  64 y.o.  female  who comes in today for follow-up of her low back pain with bilateral lower extremity pain down to her feet with numbness and tingling in her feet.    She has a lot of neuropathy in her legs and feet from her diabetes.  She states her blood sugar is running about 140 now.  She is taking Lyrica 150 mg twice a day.  She states it is helping quite a bit and she would like to stay on that dose.  She states it has been almost a year since her mother  and she is coping okay with that now.  She denies fever bowel bladder dysfunction.          Physical Exam: Patient is a 64-year-old female well-developed well-nourished who is alert and oriented with a normal mood and affect.  She has a full weightbearing nonantalgic gait.  She has 4-5 strength bilateral lower extremities.  Negative straight leg raise.  She does not use any assist device.  She has pain with hyperextension lumbar spine.        Assessment and Plan:.  This is a patient who has chronic low back pain with bilateral radicular pain, polyneuropathy and facet arthropathy along with diabetes.  I refilled her Lyrica 150 mg twice a day.  That dose works well for her and she tolerates it well.  We had a discussion regarding her blood sugar and trying to keep that down so does not cause more nerve damage.  I will see her back in 6 months sooner if needed.     Dominique was seen today for back pain.    Diagnoses and all orders for this visit:    Chronic bilateral low back pain with bilateral sciatica  -     pregabalin (LYRICA) 150 MG capsule; Take 1 capsule by mouth 2 times daily for 180 days. Max Daily Amount: 300 mg    Polyneuropathy, unspecified  -     pregabalin (LYRICA) 150 MG capsule; Take 1 capsule by mouth 2 times daily for 180 days. Max Daily Amount: 300 mg    Facet arthropathy  -     pregabalin (LYRICA) 150 MG

## 2025-05-22 DIAGNOSIS — M79.18 MYOFASCIAL PAIN: ICD-10-CM

## 2025-05-22 RX ORDER — METHOCARBAMOL 750 MG/1
TABLET, FILM COATED ORAL
Qty: 60 TABLET | Refills: 1 | Status: SHIPPED | OUTPATIENT
Start: 2025-05-22

## 2025-08-01 ENCOUNTER — TELEPHONE (OUTPATIENT)
Age: 65
End: 2025-08-01

## 2025-08-01 NOTE — TELEPHONE ENCOUNTER
1st attempt call-lvm to return call for scheduling as referred for R/shoulder;referral scanned in chart

## 2025-08-12 DIAGNOSIS — M25.511 RIGHT SHOULDER PAIN, UNSPECIFIED CHRONICITY: Primary | ICD-10-CM

## 2025-08-25 ENCOUNTER — TRANSCRIBE ORDERS (OUTPATIENT)
Facility: HOSPITAL | Age: 65
End: 2025-08-25

## 2025-08-25 ENCOUNTER — HOSPITAL ENCOUNTER (OUTPATIENT)
Facility: HOSPITAL | Age: 65
Discharge: HOME | End: 2025-08-28
Payer: COMMERCIAL

## 2025-08-25 DIAGNOSIS — M25.511 ACUTE PAIN OF RIGHT SHOULDER: Primary | ICD-10-CM

## 2025-08-25 DIAGNOSIS — M25.511 ACUTE PAIN OF RIGHT SHOULDER: ICD-10-CM

## 2025-08-25 PROCEDURE — 73030 X-RAY EXAM OF SHOULDER: CPT

## 2025-08-28 ENCOUNTER — OFFICE VISIT (OUTPATIENT)
Age: 65
End: 2025-08-28

## 2025-08-28 DIAGNOSIS — G89.29 CHRONIC PAIN OF RIGHT KNEE: Primary | ICD-10-CM

## 2025-08-28 DIAGNOSIS — M75.51 BURSITIS OF RIGHT SHOULDER: ICD-10-CM

## 2025-08-28 DIAGNOSIS — M25.561 CHRONIC PAIN OF RIGHT KNEE: Primary | ICD-10-CM

## 2025-08-28 DIAGNOSIS — M17.11 PRIMARY OSTEOARTHRITIS OF RIGHT KNEE: ICD-10-CM

## 2025-08-28 RX ORDER — LIDOCAINE HYDROCHLORIDE 10 MG/ML
9 INJECTION, SOLUTION INFILTRATION; PERINEURAL ONCE
Status: COMPLETED | OUTPATIENT
Start: 2025-08-28 | End: 2025-08-28

## 2025-08-28 RX ORDER — TRIAMCINOLONE ACETONIDE 40 MG/ML
40 INJECTION, SUSPENSION INTRA-ARTICULAR; INTRAMUSCULAR ONCE
Status: COMPLETED | OUTPATIENT
Start: 2025-08-28 | End: 2025-08-28

## 2025-08-28 RX ADMIN — LIDOCAINE HYDROCHLORIDE 9 ML: 10 INJECTION, SOLUTION INFILTRATION; PERINEURAL at 15:33

## 2025-08-28 RX ADMIN — TRIAMCINOLONE ACETONIDE 40 MG: 40 INJECTION, SUSPENSION INTRA-ARTICULAR; INTRAMUSCULAR at 15:33

## (undated) DEVICE — [TOMCAT CUTTER, ARTHROSCOPIC SHAVER BLADE,  DO NOT RESTERILIZE,  DO NOT USE IF PACKAGE IS DAMAGED,  KEEP DRY,  KEEP AWAY FROM SUNLIGHT]: Brand: FORMULA

## (undated) DEVICE — INFLOW CASSETTE TUBING, DO NOT USE IF PACKAGE IS DAMAGED: Brand: CROSSFLOW

## (undated) DEVICE — APPLICATOR MEDICATED 26 CC SOLUTION HI LT ORNG CHLORAPREP

## (undated) DEVICE — 4-PORT MANIFOLD: Brand: NEPTUNE 2

## (undated) DEVICE — KNEE ARTHROSCOPY III-LF: Brand: MEDLINE INDUSTRIES, INC.

## (undated) DEVICE — SOLUTION IRRIG 3000ML 0.9% SOD CHL FLX CONT 0797208] ICU MEDICAL INC]

## (undated) DEVICE — SUTURE MCRYL SZ 4-0 L18IN ABSRB UD L19MM PS-2 3/8 CIR PRIM Y496G

## (undated) DEVICE — ZIMMER® STERILE DISPOSABLE TOURNIQUET CUFF WITH PROTECTIVE SLEEVE AND PLC, DUAL PORT, SINGLE BLADDER, 34 IN. (86 CM)

## (undated) DEVICE — APPLICATOR BNDG 1MM ADH PREMIERPRO EXOFIN

## (undated) DEVICE — INTENDED FOR TISSUE SEPARATION, AND OTHER PROCEDURES THAT REQUIRE A SHARP SURGICAL BLADE TO PUNCTURE OR CUT.: Brand: BARD-PARKER SAFETY BLADES SIZE 11, STERILE

## (undated) DEVICE — POSITIONER ARTHSCP KNEE HLDR WHT W/O CVR